# Patient Record
Sex: FEMALE | Race: WHITE | NOT HISPANIC OR LATINO | Employment: OTHER | URBAN - METROPOLITAN AREA
[De-identification: names, ages, dates, MRNs, and addresses within clinical notes are randomized per-mention and may not be internally consistent; named-entity substitution may affect disease eponyms.]

---

## 2024-03-27 ENCOUNTER — APPOINTMENT (OUTPATIENT)
Dept: RADIOLOGY | Facility: MEDICAL CENTER | Age: 89
DRG: 480 | End: 2024-03-27
Attending: STUDENT IN AN ORGANIZED HEALTH CARE EDUCATION/TRAINING PROGRAM
Payer: COMMERCIAL

## 2024-03-27 ENCOUNTER — HOSPITAL ENCOUNTER (INPATIENT)
Facility: MEDICAL CENTER | Age: 89
DRG: 480 | End: 2024-03-27
Attending: STUDENT IN AN ORGANIZED HEALTH CARE EDUCATION/TRAINING PROGRAM | Admitting: HOSPITALIST
Payer: COMMERCIAL

## 2024-03-27 DIAGNOSIS — S72.422A CLOSED BICONDYLAR FRACTURE OF LEFT FEMUR, INITIAL ENCOUNTER (HCC): ICD-10-CM

## 2024-03-27 DIAGNOSIS — S09.90XA CLOSED HEAD INJURY, INITIAL ENCOUNTER: ICD-10-CM

## 2024-03-27 DIAGNOSIS — I10 PRIMARY HYPERTENSION: ICD-10-CM
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
## 2024-03-27 DIAGNOSIS — K22.10 EROSIVE ESOPHAGITIS: ICD-10-CM

## 2024-03-27 DIAGNOSIS — S72.432A CLOSED BICONDYLAR FRACTURE OF LEFT FEMUR, INITIAL ENCOUNTER (HCC): ICD-10-CM

## 2024-03-27 LAB
ALBUMIN SERPL BCP-MCNC: 3.8 G/DL (ref 3.2–4.9)
ALBUMIN/GLOB SERPL: 1.6 G/DL
ALP SERPL-CCNC: 109 U/L (ref 30–99)
ALT SERPL-CCNC: 15 U/L (ref 2–50)
ANION GAP SERPL CALC-SCNC: 10 MMOL/L (ref 7–16)
APTT PPP: 54.9 SEC (ref 24.7–36)
AST SERPL-CCNC: 20 U/L (ref 12–45)
BASOPHILS # BLD AUTO: 0.5 % (ref 0–1.8)
BASOPHILS # BLD: 0.05 K/UL (ref 0–0.12)
BILIRUB SERPL-MCNC: 0.4 MG/DL (ref 0.1–1.5)
BUN SERPL-MCNC: 23 MG/DL (ref 8–22)
CALCIUM ALBUM COR SERPL-MCNC: 9.5 MG/DL (ref 8.5–10.5)
CALCIUM SERPL-MCNC: 9.3 MG/DL (ref 8.5–10.5)
CHLORIDE SERPL-SCNC: 97 MMOL/L (ref 96–112)
CO2 SERPL-SCNC: 25 MMOL/L (ref 20–33)
CREAT SERPL-MCNC: 1.31 MG/DL (ref 0.5–1.4)
EKG IMPRESSION: NORMAL
EOSINOPHIL # BLD AUTO: 0.04 K/UL (ref 0–0.51)
EOSINOPHIL NFR BLD: 0.4 % (ref 0–6.9)
ERYTHROCYTE [DISTWIDTH] IN BLOOD BY AUTOMATED COUNT: 43.2 FL (ref 35.9–50)
GFR SERPLBLD CREATININE-BSD FMLA CKD-EPI: 39 ML/MIN/1.73 M 2
GLOBULIN SER CALC-MCNC: 2.4 G/DL (ref 1.9–3.5)
GLUCOSE SERPL-MCNC: 114 MG/DL (ref 65–99)
HCT VFR BLD AUTO: 30.4 % (ref 37–47)
HGB BLD-MCNC: 10.1 G/DL (ref 12–16)
IMM GRANULOCYTES # BLD AUTO: 0.04 K/UL (ref 0–0.11)
IMM GRANULOCYTES NFR BLD AUTO: 0.4 % (ref 0–0.9)
INR PPP: 1.99 (ref 0.87–1.13)
LYMPHOCYTES # BLD AUTO: 1.3 K/UL (ref 1–4.8)
LYMPHOCYTES NFR BLD: 13.7 % (ref 22–41)
MCH RBC QN AUTO: 31.9 PG (ref 27–33)
MCHC RBC AUTO-ENTMCNC: 33.2 G/DL (ref 32.2–35.5)
MCV RBC AUTO: 95.9 FL (ref 81.4–97.8)
MONOCYTES # BLD AUTO: 0.7 K/UL (ref 0–0.85)
MONOCYTES NFR BLD AUTO: 7.4 % (ref 0–13.4)
NEUTROPHILS # BLD AUTO: 7.33 K/UL (ref 1.82–7.42)
NEUTROPHILS NFR BLD: 77.6 % (ref 44–72)
NRBC # BLD AUTO: 0 K/UL
NRBC BLD-RTO: 0 /100 WBC (ref 0–0.2)
PLATELET # BLD AUTO: 165 K/UL (ref 164–446)
PMV BLD AUTO: 10.5 FL (ref 9–12.9)
POTASSIUM SERPL-SCNC: 4.9 MMOL/L (ref 3.6–5.5)
PROT SERPL-MCNC: 6.2 G/DL (ref 6–8.2)
PROTHROMBIN TIME: 22.8 SEC (ref 12–14.6)
RBC # BLD AUTO: 3.17 M/UL (ref 4.2–5.4)
SODIUM SERPL-SCNC: 132 MMOL/L (ref 135–145)
WBC # BLD AUTO: 9.5 K/UL (ref 4.8–10.8)

## 2024-03-27 PROCEDURE — 700111 HCHG RX REV CODE 636 W/ 250 OVERRIDE (IP): Mod: JZ | Performed by: STUDENT IN AN ORGANIZED HEALTH CARE EDUCATION/TRAINING PROGRAM

## 2024-03-27 PROCEDURE — 85730 THROMBOPLASTIN TIME PARTIAL: CPT

## 2024-03-27 PROCEDURE — 73560 X-RAY EXAM OF KNEE 1 OR 2: CPT | Mod: LT

## 2024-03-27 PROCEDURE — 99285 EMERGENCY DEPT VISIT HI MDM: CPT

## 2024-03-27 PROCEDURE — 70450 CT HEAD/BRAIN W/O DYE: CPT

## 2024-03-27 PROCEDURE — 80053 COMPREHEN METABOLIC PANEL: CPT

## 2024-03-27 PROCEDURE — 85610 PROTHROMBIN TIME: CPT

## 2024-03-27 PROCEDURE — 85025 COMPLETE CBC W/AUTO DIFF WBC: CPT

## 2024-03-27 PROCEDURE — 700111 HCHG RX REV CODE 636 W/ 250 OVERRIDE (IP): Performed by: HOSPITALIST

## 2024-03-27 PROCEDURE — 770006 HCHG ROOM/CARE - MED/SURG/GYN SEMI*

## 2024-03-27 PROCEDURE — 96374 THER/PROPH/DIAG INJ IV PUSH: CPT

## 2024-03-27 PROCEDURE — 99223 1ST HOSP IP/OBS HIGH 75: CPT | Mod: AI | Performed by: HOSPITALIST

## 2024-03-27 PROCEDURE — 93005 ELECTROCARDIOGRAM TRACING: CPT | Performed by: STUDENT IN AN ORGANIZED HEALTH CARE EDUCATION/TRAINING PROGRAM

## 2024-03-27 PROCEDURE — 96375 TX/PRO/DX INJ NEW DRUG ADDON: CPT

## 2024-03-27 PROCEDURE — 36415 COLL VENOUS BLD VENIPUNCTURE: CPT

## 2024-03-27 RX ORDER — FUROSEMIDE 40 MG/1
80 TABLET ORAL EVERY MORNING
Status: ON HOLD | COMMUNITY
End: 2024-04-03

## 2024-03-27 RX ORDER — CARVEDILOL 6.25 MG/1
6.25 TABLET ORAL 2 TIMES DAILY WITH MEALS
Status: ON HOLD | COMMUNITY
End: 2024-04-03

## 2024-03-27 RX ORDER — LORATADINE 10 MG/1
10 TABLET ORAL DAILY
COMMUNITY

## 2024-03-27 RX ORDER — LISINOPRIL 5 MG/1
5 TABLET ORAL EVERY MORNING
COMMUNITY

## 2024-03-27 RX ORDER — ASPIRIN 81 MG/1
81 TABLET ORAL EVERY MORNING
Status: SHIPPED | COMMUNITY
End: 2024-03-27

## 2024-03-27 RX ORDER — ONDANSETRON 2 MG/ML
4 INJECTION INTRAMUSCULAR; INTRAVENOUS EVERY 4 HOURS PRN
Status: DISCONTINUED | OUTPATIENT
Start: 2024-03-27 | End: 2024-03-28

## 2024-03-27 RX ORDER — LEVOTHYROXINE SODIUM 175 UG/1
175 TABLET ORAL
COMMUNITY

## 2024-03-27 RX ORDER — OXYCODONE HYDROCHLORIDE 5 MG/1
5 TABLET ORAL
Status: DISCONTINUED | OUTPATIENT
Start: 2024-03-27 | End: 2024-03-28

## 2024-03-27 RX ORDER — OXYCODONE HYDROCHLORIDE 5 MG/1
2.5 TABLET ORAL
Status: DISCONTINUED | OUTPATIENT
Start: 2024-03-27 | End: 2024-03-28

## 2024-03-27 RX ORDER — MORPHINE SULFATE 4 MG/ML
4 INJECTION INTRAVENOUS ONCE
Status: COMPLETED | OUTPATIENT
Start: 2024-03-27 | End: 2024-03-27

## 2024-03-27 RX ORDER — ONDANSETRON 4 MG/1
4 TABLET, ORALLY DISINTEGRATING ORAL EVERY 4 HOURS PRN
Status: DISCONTINUED | OUTPATIENT
Start: 2024-03-27 | End: 2024-04-04 | Stop reason: HOSPADM

## 2024-03-27 RX ORDER — ONDANSETRON 4 MG/1
4 TABLET, ORALLY DISINTEGRATING ORAL EVERY 6 HOURS PRN
Status: SHIPPED | COMMUNITY
End: 2024-03-27

## 2024-03-27 RX ORDER — ONDANSETRON 2 MG/ML
4 INJECTION INTRAMUSCULAR; INTRAVENOUS ONCE
Status: COMPLETED | OUTPATIENT
Start: 2024-03-27 | End: 2024-03-27

## 2024-03-27 RX ORDER — SPIRONOLACTONE 25 MG/1
25 TABLET ORAL EVERY MORNING
COMMUNITY

## 2024-03-27 RX ORDER — NITROGLYCERIN 0.4 MG/1
0.4 TABLET SUBLINGUAL
COMMUNITY

## 2024-03-27 RX ORDER — DABIGATRAN ETEXILATE 150 MG/1
150 CAPSULE ORAL 2 TIMES DAILY
COMMUNITY

## 2024-03-27 RX ORDER — BUDESONIDE AND FORMOTEROL FUMARATE DIHYDRATE 80; 4.5 UG/1; UG/1
2 AEROSOL RESPIRATORY (INHALATION) 2 TIMES DAILY
Status: SHIPPED | COMMUNITY
End: 2024-03-27

## 2024-03-27 RX ORDER — ATORVASTATIN CALCIUM 40 MG/1
40 TABLET, FILM COATED ORAL NIGHTLY
COMMUNITY

## 2024-03-27 RX ORDER — ACETAMINOPHEN 325 MG/1
650 TABLET ORAL EVERY 6 HOURS PRN
Status: DISCONTINUED | OUTPATIENT
Start: 2024-03-27 | End: 2024-03-28

## 2024-03-27 RX ORDER — MORPHINE SULFATE 4 MG/ML
4 INJECTION INTRAVENOUS
Status: DISCONTINUED | OUTPATIENT
Start: 2024-03-27 | End: 2024-03-28

## 2024-03-27 RX ORDER — MONTELUKAST SODIUM 10 MG/1
10 TABLET ORAL EVERY MORNING
COMMUNITY

## 2024-03-27 RX ORDER — ALBUTEROL SULFATE 90 UG/1
2 AEROSOL, METERED RESPIRATORY (INHALATION) PRN
COMMUNITY

## 2024-03-27 RX ORDER — HYDROMORPHONE HYDROCHLORIDE 1 MG/ML
0.25 INJECTION, SOLUTION INTRAMUSCULAR; INTRAVENOUS; SUBCUTANEOUS
Status: DISCONTINUED | OUTPATIENT
Start: 2024-03-27 | End: 2024-03-28

## 2024-03-27 RX ADMIN — HYDROMORPHONE HYDROCHLORIDE 0.25 MG: 1 INJECTION, SOLUTION INTRAMUSCULAR; INTRAVENOUS; SUBCUTANEOUS at 22:54

## 2024-03-27 RX ADMIN — ONDANSETRON 4 MG: 2 INJECTION INTRAMUSCULAR; INTRAVENOUS at 21:14

## 2024-03-27 RX ADMIN — MORPHINE SULFATE 4 MG: 4 INJECTION, SOLUTION INTRAMUSCULAR; INTRAVENOUS at 21:15

## 2024-03-27 ASSESSMENT — FIBROSIS 4 INDEX: FIB4 SCORE: 2.69

## 2024-03-28 ENCOUNTER — APPOINTMENT (OUTPATIENT)
Dept: RADIOLOGY | Facility: MEDICAL CENTER | Age: 89
DRG: 480 | End: 2024-03-28
Attending: ORTHOPAEDIC SURGERY
Payer: COMMERCIAL

## 2024-03-28 ENCOUNTER — ANESTHESIA (OUTPATIENT)
Dept: SURGERY | Facility: MEDICAL CENTER | Age: 89
End: 2024-03-28
Payer: COMMERCIAL

## 2024-03-28 ENCOUNTER — ANESTHESIA EVENT (OUTPATIENT)
Dept: SURGERY | Facility: MEDICAL CENTER | Age: 89
End: 2024-03-28
Payer: COMMERCIAL

## 2024-03-28 PROBLEM — J44.9 COPD (CHRONIC OBSTRUCTIVE PULMONARY DISEASE) (HCC): Status: ACTIVE | Noted: 2024-03-28

## 2024-03-28 PROBLEM — S72.409A CLOSED FRACTURE OF DISTAL END OF FEMUR (HCC): Status: ACTIVE | Noted: 2024-03-27

## 2024-03-28 PROBLEM — I35.0 AORTIC STENOSIS: Status: ACTIVE | Noted: 2024-03-28

## 2024-03-28 PROBLEM — Z01.818 ENCOUNTER FOR PREOPERATIVE ASSESSMENT: Status: ACTIVE | Noted: 2024-03-28
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
## 2024-03-28 PROBLEM — I10 HYPERTENSION: Status: ACTIVE | Noted: 2024-03-28

## 2024-03-28 PROBLEM — I48.91 ATRIAL FIBRILLATION (HCC): Status: ACTIVE | Noted: 2024-03-28

## 2024-03-28 PROBLEM — J96.11 CHRONIC HYPOXIC RESPIRATORY FAILURE (HCC): Status: ACTIVE | Noted: 2024-03-28

## 2024-03-28 LAB
ABO + RH BLD: NORMAL
ABO GROUP BLD: NORMAL
ALBUMIN SERPL BCP-MCNC: 3.7 G/DL (ref 3.2–4.9)
ALBUMIN/GLOB SERPL: 1.5 G/DL
ALP SERPL-CCNC: 116 U/L (ref 30–99)
ALT SERPL-CCNC: 14 U/L (ref 2–50)
ANION GAP SERPL CALC-SCNC: 15 MMOL/L (ref 7–16)
AST SERPL-CCNC: 17 U/L (ref 12–45)
BARCODED ABORH UBTYP: 1700
BARCODED PRD CODE UBPRD: NORMAL
BARCODED UNIT NUM UBUNT: NORMAL
BASOPHILS # BLD AUTO: 0.3 % (ref 0–1.8)
BASOPHILS # BLD: 0.03 K/UL (ref 0–0.12)
BILIRUB SERPL-MCNC: 0.7 MG/DL (ref 0.1–1.5)
BLD GP AB SCN SERPL QL: NORMAL
BUN SERPL-MCNC: 24 MG/DL (ref 8–22)
CALCIUM ALBUM COR SERPL-MCNC: 9.8 MG/DL (ref 8.5–10.5)
CALCIUM SERPL-MCNC: 9.6 MG/DL (ref 8.5–10.5)
CHLORIDE SERPL-SCNC: 96 MMOL/L (ref 96–112)
CO2 SERPL-SCNC: 23 MMOL/L (ref 20–33)
COMPONENT R 8504R: NORMAL
CREAT SERPL-MCNC: 1.37 MG/DL (ref 0.5–1.4)
EOSINOPHIL # BLD AUTO: 0 K/UL (ref 0–0.51)
EOSINOPHIL NFR BLD: 0 % (ref 0–6.9)
ERYTHROCYTE [DISTWIDTH] IN BLOOD BY AUTOMATED COUNT: 43.6 FL (ref 35.9–50)
GFR SERPLBLD CREATININE-BSD FMLA CKD-EPI: 37 ML/MIN/1.73 M 2
GLOBULIN SER CALC-MCNC: 2.5 G/DL (ref 1.9–3.5)
GLUCOSE SERPL-MCNC: 125 MG/DL (ref 65–99)
HCT VFR BLD AUTO: 29.6 % (ref 37–47)
HGB BLD-MCNC: 9.7 G/DL (ref 12–16)
IMM GRANULOCYTES # BLD AUTO: 0.07 K/UL (ref 0–0.11)
IMM GRANULOCYTES NFR BLD AUTO: 0.6 % (ref 0–0.9)
LYMPHOCYTES # BLD AUTO: 0.94 K/UL (ref 1–4.8)
LYMPHOCYTES NFR BLD: 8.7 % (ref 22–41)
MCH RBC QN AUTO: 31.4 PG (ref 27–33)
MCHC RBC AUTO-ENTMCNC: 32.8 G/DL (ref 32.2–35.5)
MCV RBC AUTO: 95.8 FL (ref 81.4–97.8)
MONOCYTES # BLD AUTO: 0.67 K/UL (ref 0–0.85)
MONOCYTES NFR BLD AUTO: 6.2 % (ref 0–13.4)
NEUTROPHILS # BLD AUTO: 9.15 K/UL (ref 1.82–7.42)
NEUTROPHILS NFR BLD: 84.2 % (ref 44–72)
NRBC # BLD AUTO: 0 K/UL
NRBC BLD-RTO: 0 /100 WBC (ref 0–0.2)
PLATELET # BLD AUTO: 173 K/UL (ref 164–446)
PMV BLD AUTO: 11.4 FL (ref 9–12.9)
POTASSIUM SERPL-SCNC: 4.8 MMOL/L (ref 3.6–5.5)
PRODUCT TYPE UPROD: NORMAL
PROT SERPL-MCNC: 6.2 G/DL (ref 6–8.2)
RBC # BLD AUTO: 3.09 M/UL (ref 4.2–5.4)
RH BLD: NORMAL
SODIUM SERPL-SCNC: 134 MMOL/L (ref 135–145)
UNIT STATUS USTAT: NORMAL
WBC # BLD AUTO: 10.9 K/UL (ref 4.8–10.8)

## 2024-03-28 PROCEDURE — 27511 TREATMENT OF THIGH FRACTURE: CPT | Mod: 80ROC,LT | Performed by: STUDENT IN AN ORGANIZED HEALTH CARE EDUCATION/TRAINING PROGRAM

## 2024-03-28 PROCEDURE — A9270 NON-COVERED ITEM OR SERVICE: HCPCS | Performed by: ORTHOPAEDIC SURGERY

## 2024-03-28 PROCEDURE — 700101 HCHG RX REV CODE 250: Performed by: ANESTHESIOLOGY

## 2024-03-28 PROCEDURE — 0QSC04Z REPOSITION LEFT LOWER FEMUR WITH INTERNAL FIXATION DEVICE, OPEN APPROACH: ICD-10-PCS | Performed by: ORTHOPAEDIC SURGERY

## 2024-03-28 PROCEDURE — 160002 HCHG RECOVERY MINUTES (STAT): Performed by: ORTHOPAEDIC SURGERY

## 2024-03-28 PROCEDURE — 99233 SBSQ HOSP IP/OBS HIGH 50: CPT | Performed by: HOSPITALIST

## 2024-03-28 PROCEDURE — 27511 TREATMENT OF THIGH FRACTURE: CPT | Mod: LT | Performed by: ORTHOPAEDIC SURGERY

## 2024-03-28 PROCEDURE — 86900 BLOOD TYPING SEROLOGIC ABO: CPT

## 2024-03-28 PROCEDURE — A9270 NON-COVERED ITEM OR SERVICE: HCPCS | Performed by: ANESTHESIOLOGY

## 2024-03-28 PROCEDURE — 700102 HCHG RX REV CODE 250 W/ 637 OVERRIDE(OP): Performed by: ORTHOPAEDIC SURGERY

## 2024-03-28 PROCEDURE — 700111 HCHG RX REV CODE 636 W/ 250 OVERRIDE (IP): Performed by: HOSPITALIST

## 2024-03-28 PROCEDURE — A9270 NON-COVERED ITEM OR SERVICE: HCPCS | Performed by: HOSPITALIST

## 2024-03-28 PROCEDURE — 86901 BLOOD TYPING SEROLOGIC RH(D): CPT

## 2024-03-28 PROCEDURE — 700111 HCHG RX REV CODE 636 W/ 250 OVERRIDE (IP): Mod: JZ | Performed by: ANESTHESIOLOGY

## 2024-03-28 PROCEDURE — C1713 ANCHOR/SCREW BN/BN,TIS/BN: HCPCS | Performed by: ORTHOPAEDIC SURGERY

## 2024-03-28 PROCEDURE — 160009 HCHG ANES TIME/MIN: Performed by: ORTHOPAEDIC SURGERY

## 2024-03-28 PROCEDURE — 770006 HCHG ROOM/CARE - MED/SURG/GYN SEMI*

## 2024-03-28 PROCEDURE — 80053 COMPREHEN METABOLIC PANEL: CPT

## 2024-03-28 PROCEDURE — 700102 HCHG RX REV CODE 250 W/ 637 OVERRIDE(OP): Performed by: HOSPITALIST

## 2024-03-28 PROCEDURE — 160048 HCHG OR STATISTICAL LEVEL 1-5: Performed by: ORTHOPAEDIC SURGERY

## 2024-03-28 PROCEDURE — 94664 DEMO&/EVAL PT USE INHALER: CPT

## 2024-03-28 PROCEDURE — 502000 HCHG MISC OR IMPLANTS RC 0278: Performed by: ORTHOPAEDIC SURGERY

## 2024-03-28 PROCEDURE — 86923 COMPATIBILITY TEST ELECTRIC: CPT

## 2024-03-28 PROCEDURE — 86850 RBC ANTIBODY SCREEN: CPT

## 2024-03-28 PROCEDURE — 700111 HCHG RX REV CODE 636 W/ 250 OVERRIDE (IP): Performed by: INTERNAL MEDICINE

## 2024-03-28 PROCEDURE — 700101 HCHG RX REV CODE 250: Performed by: INTERNAL MEDICINE

## 2024-03-28 PROCEDURE — 700102 HCHG RX REV CODE 250 W/ 637 OVERRIDE(OP): Performed by: ANESTHESIOLOGY

## 2024-03-28 PROCEDURE — 73552 X-RAY EXAM OF FEMUR 2/>: CPT | Mod: LT

## 2024-03-28 PROCEDURE — 700111 HCHG RX REV CODE 636 W/ 250 OVERRIDE (IP): Performed by: ANESTHESIOLOGY

## 2024-03-28 PROCEDURE — 700105 HCHG RX REV CODE 258: Performed by: ANESTHESIOLOGY

## 2024-03-28 PROCEDURE — P9016 RBC LEUKOCYTES REDUCED: HCPCS

## 2024-03-28 PROCEDURE — 99222 1ST HOSP IP/OBS MODERATE 55: CPT | Mod: 57 | Performed by: ORTHOPAEDIC SURGERY

## 2024-03-28 PROCEDURE — 160039 HCHG SURGERY MINUTES - EA ADDL 1 MIN LEVEL 3: Performed by: ORTHOPAEDIC SURGERY

## 2024-03-28 PROCEDURE — 160028 HCHG SURGERY MINUTES - 1ST 30 MINS LEVEL 3: Performed by: ORTHOPAEDIC SURGERY

## 2024-03-28 PROCEDURE — 36415 COLL VENOUS BLD VENIPUNCTURE: CPT

## 2024-03-28 PROCEDURE — 160035 HCHG PACU - 1ST 60 MINS PHASE I: Performed by: ORTHOPAEDIC SURGERY

## 2024-03-28 PROCEDURE — 700111 HCHG RX REV CODE 636 W/ 250 OVERRIDE (IP): Performed by: ORTHOPAEDIC SURGERY

## 2024-03-28 PROCEDURE — 85025 COMPLETE CBC W/AUTO DIFF WBC: CPT

## 2024-03-28 PROCEDURE — 36430 TRANSFUSION BLD/BLD COMPNT: CPT

## 2024-03-28 PROCEDURE — 700105 HCHG RX REV CODE 258: Performed by: INTERNAL MEDICINE

## 2024-03-28 DEVICE — IMPLANTABLE DEVICE: Type: IMPLANTABLE DEVICE | Status: FUNCTIONAL

## 2024-03-28 DEVICE — 4.5MM CORTEX TI SCREW 4.5MM  L50MM: Type: IMPLANTABLE DEVICE | Status: FUNCTIONAL

## 2024-03-28 DEVICE — 4.5MM CORTEX TI SCREW 4.5MM  L40MM: Type: IMPLANTABLE DEVICE | Status: FUNCTIONAL

## 2024-03-28 DEVICE — 5MM LOCKING SCREW 5.0MM  L85MM: Type: IMPLANTABLE DEVICE | Status: FUNCTIONAL

## 2024-03-28 RX ORDER — SCOLOPAMINE TRANSDERMAL SYSTEM 1 MG/1
1 PATCH, EXTENDED RELEASE TRANSDERMAL
Status: DISCONTINUED | OUTPATIENT
Start: 2024-03-28 | End: 2024-04-04 | Stop reason: HOSPADM

## 2024-03-28 RX ORDER — HYDROMORPHONE HYDROCHLORIDE 1 MG/ML
0.5 INJECTION, SOLUTION INTRAMUSCULAR; INTRAVENOUS; SUBCUTANEOUS
Status: DISCONTINUED | OUTPATIENT
Start: 2024-03-28 | End: 2024-04-04 | Stop reason: HOSPADM

## 2024-03-28 RX ORDER — METOPROLOL TARTRATE 1 MG/ML
INJECTION, SOLUTION INTRAVENOUS PRN
Status: DISCONTINUED | OUTPATIENT
Start: 2024-03-28 | End: 2024-03-28 | Stop reason: SURG

## 2024-03-28 RX ORDER — DEXAMETHASONE SODIUM PHOSPHATE 4 MG/ML
4 INJECTION, SOLUTION INTRA-ARTICULAR; INTRALESIONAL; INTRAMUSCULAR; INTRAVENOUS; SOFT TISSUE
Status: COMPLETED | OUTPATIENT
Start: 2024-03-28 | End: 2024-03-29

## 2024-03-28 RX ORDER — KETOROLAC TROMETHAMINE 15 MG/ML
15 INJECTION, SOLUTION INTRAMUSCULAR; INTRAVENOUS EVERY 6 HOURS
Qty: 12 ML | Refills: 0 | Status: DISCONTINUED | OUTPATIENT
Start: 2024-03-28 | End: 2024-03-29

## 2024-03-28 RX ORDER — ACETAMINOPHEN 325 MG/1
650 TABLET ORAL EVERY 6 HOURS PRN
Status: DISCONTINUED | OUTPATIENT
Start: 2024-04-02 | End: 2024-03-30

## 2024-03-28 RX ORDER — ORPHENADRINE CITRATE 100 MG/1
100 TABLET, EXTENDED RELEASE ORAL 2 TIMES DAILY
COMMUNITY
Start: 2024-03-26

## 2024-03-28 RX ORDER — ENEMA 19; 7 G/133ML; G/133ML
1 ENEMA RECTAL
Status: DISCONTINUED | OUTPATIENT
Start: 2024-03-28 | End: 2024-04-04 | Stop reason: HOSPADM

## 2024-03-28 RX ORDER — AMOXICILLIN 250 MG
1 CAPSULE ORAL NIGHTLY
Status: DISCONTINUED | OUTPATIENT
Start: 2024-03-28 | End: 2024-04-04 | Stop reason: HOSPADM

## 2024-03-28 RX ORDER — DOCUSATE SODIUM 100 MG/1
100 CAPSULE, LIQUID FILLED ORAL 2 TIMES DAILY
Status: DISCONTINUED | OUTPATIENT
Start: 2024-03-28 | End: 2024-04-04 | Stop reason: HOSPADM

## 2024-03-28 RX ORDER — HYDROMORPHONE HYDROCHLORIDE 1 MG/ML
1 INJECTION, SOLUTION INTRAMUSCULAR; INTRAVENOUS; SUBCUTANEOUS ONCE
Status: COMPLETED | OUTPATIENT
Start: 2024-03-28 | End: 2024-03-28

## 2024-03-28 RX ORDER — ALBUTEROL SULFATE 90 UG/1
2 AEROSOL, METERED RESPIRATORY (INHALATION) PRN
Status: DISCONTINUED | OUTPATIENT
Start: 2024-03-28 | End: 2024-04-04 | Stop reason: HOSPADM

## 2024-03-28 RX ORDER — HYDROMORPHONE HYDROCHLORIDE 1 MG/ML
0.1 INJECTION, SOLUTION INTRAMUSCULAR; INTRAVENOUS; SUBCUTANEOUS
Status: DISCONTINUED | OUTPATIENT
Start: 2024-03-28 | End: 2024-03-28 | Stop reason: HOSPADM

## 2024-03-28 RX ORDER — PHENYLEPHRINE HYDROCHLORIDE 10 MG/ML
INJECTION, SOLUTION INTRAMUSCULAR; INTRAVENOUS; SUBCUTANEOUS PRN
Status: DISCONTINUED | OUTPATIENT
Start: 2024-03-28 | End: 2024-03-28 | Stop reason: SURG

## 2024-03-28 RX ORDER — BISACODYL 10 MG
10 SUPPOSITORY, RECTAL RECTAL
Status: DISCONTINUED | OUTPATIENT
Start: 2024-03-28 | End: 2024-04-04 | Stop reason: HOSPADM

## 2024-03-28 RX ORDER — OXYCODONE HYDROCHLORIDE 5 MG/1
5 TABLET ORAL
Status: DISCONTINUED | OUTPATIENT
Start: 2024-03-28 | End: 2024-04-04 | Stop reason: HOSPADM

## 2024-03-28 RX ORDER — HYDROMORPHONE HYDROCHLORIDE 1 MG/ML
0.2 INJECTION, SOLUTION INTRAMUSCULAR; INTRAVENOUS; SUBCUTANEOUS
Status: DISCONTINUED | OUTPATIENT
Start: 2024-03-28 | End: 2024-03-28 | Stop reason: HOSPADM

## 2024-03-28 RX ORDER — ONDANSETRON 2 MG/ML
INJECTION INTRAMUSCULAR; INTRAVENOUS PRN
Status: DISCONTINUED | OUTPATIENT
Start: 2024-03-28 | End: 2024-03-28 | Stop reason: SURG

## 2024-03-28 RX ORDER — HALOPERIDOL 5 MG/ML
1 INJECTION INTRAMUSCULAR
Status: DISCONTINUED | OUTPATIENT
Start: 2024-03-28 | End: 2024-03-28 | Stop reason: HOSPADM

## 2024-03-28 RX ORDER — ESMOLOL HYDROCHLORIDE 10 MG/ML
INJECTION INTRAVENOUS PRN
Status: DISCONTINUED | OUTPATIENT
Start: 2024-03-28 | End: 2024-03-28 | Stop reason: SURG

## 2024-03-28 RX ORDER — OXYCODONE HYDROCHLORIDE 5 MG/1
5 TABLET ORAL
Status: DISCONTINUED | OUTPATIENT
Start: 2024-03-28 | End: 2024-03-28

## 2024-03-28 RX ORDER — OXYCODONE HCL 5 MG/5 ML
10 SOLUTION, ORAL ORAL
Status: COMPLETED | OUTPATIENT
Start: 2024-03-28 | End: 2024-03-28

## 2024-03-28 RX ORDER — ROCURONIUM BROMIDE 10 MG/ML
INJECTION, SOLUTION INTRAVENOUS PRN
Status: DISCONTINUED | OUTPATIENT
Start: 2024-03-28 | End: 2024-03-28 | Stop reason: SURG

## 2024-03-28 RX ORDER — LEVOTHYROXINE SODIUM 175 UG/1
175 TABLET ORAL
Status: DISCONTINUED | OUTPATIENT
Start: 2024-03-28 | End: 2024-04-04 | Stop reason: HOSPADM

## 2024-03-28 RX ORDER — OXYCODONE HYDROCHLORIDE 5 MG/1
2.5 TABLET ORAL
Status: DISCONTINUED | OUTPATIENT
Start: 2024-03-28 | End: 2024-03-28

## 2024-03-28 RX ORDER — HYDRALAZINE HYDROCHLORIDE 20 MG/ML
5 INJECTION INTRAMUSCULAR; INTRAVENOUS
Status: DISCONTINUED | OUTPATIENT
Start: 2024-03-28 | End: 2024-03-28 | Stop reason: HOSPADM

## 2024-03-28 RX ORDER — OXYCODONE HYDROCHLORIDE 10 MG/1
10 TABLET ORAL
Status: DISCONTINUED | OUTPATIENT
Start: 2024-03-28 | End: 2024-04-04 | Stop reason: HOSPADM

## 2024-03-28 RX ORDER — HALOPERIDOL 5 MG/ML
1 INJECTION INTRAMUSCULAR EVERY 6 HOURS PRN
Status: DISCONTINUED | OUTPATIENT
Start: 2024-03-28 | End: 2024-04-04 | Stop reason: HOSPADM

## 2024-03-28 RX ORDER — POLYETHYLENE GLYCOL 3350 17 G/17G
1 POWDER, FOR SOLUTION ORAL 2 TIMES DAILY PRN
Status: DISCONTINUED | OUTPATIENT
Start: 2024-03-28 | End: 2024-04-04 | Stop reason: HOSPADM

## 2024-03-28 RX ORDER — ONDANSETRON 2 MG/ML
4 INJECTION INTRAMUSCULAR; INTRAVENOUS EVERY 4 HOURS PRN
Status: DISCONTINUED | OUTPATIENT
Start: 2024-03-28 | End: 2024-04-04 | Stop reason: HOSPADM

## 2024-03-28 RX ORDER — DIPHENHYDRAMINE HYDROCHLORIDE 50 MG/ML
25 INJECTION INTRAMUSCULAR; INTRAVENOUS EVERY 6 HOURS PRN
Status: DISCONTINUED | OUTPATIENT
Start: 2024-03-28 | End: 2024-04-02

## 2024-03-28 RX ORDER — HYDROMORPHONE HYDROCHLORIDE 1 MG/ML
1 INJECTION, SOLUTION INTRAMUSCULAR; INTRAVENOUS; SUBCUTANEOUS
Status: DISCONTINUED | OUTPATIENT
Start: 2024-03-28 | End: 2024-03-28

## 2024-03-28 RX ORDER — LISINOPRIL 5 MG/1
5 TABLET ORAL EVERY MORNING
Status: DISCONTINUED | OUTPATIENT
Start: 2024-03-28 | End: 2024-04-04 | Stop reason: HOSPADM

## 2024-03-28 RX ORDER — ENOXAPARIN SODIUM 100 MG/ML
40 INJECTION SUBCUTANEOUS DAILY
Status: DISCONTINUED | OUTPATIENT
Start: 2024-03-29 | End: 2024-03-29

## 2024-03-28 RX ORDER — CARVEDILOL 6.25 MG/1
6.25 TABLET ORAL 2 TIMES DAILY WITH MEALS
Status: DISCONTINUED | OUTPATIENT
Start: 2024-03-28 | End: 2024-03-29

## 2024-03-28 RX ORDER — MONTELUKAST SODIUM 10 MG/1
10 TABLET ORAL EVERY MORNING
Status: DISCONTINUED | OUTPATIENT
Start: 2024-03-28 | End: 2024-04-04 | Stop reason: HOSPADM

## 2024-03-28 RX ORDER — AMOXICILLIN 250 MG
1 CAPSULE ORAL
Status: DISCONTINUED | OUTPATIENT
Start: 2024-03-28 | End: 2024-04-04 | Stop reason: HOSPADM

## 2024-03-28 RX ORDER — ACETAMINOPHEN 325 MG/1
650 TABLET ORAL EVERY 6 HOURS
Status: DISCONTINUED | OUTPATIENT
Start: 2024-03-28 | End: 2024-03-30

## 2024-03-28 RX ORDER — ONDANSETRON 2 MG/ML
4 INJECTION INTRAMUSCULAR; INTRAVENOUS
Status: DISCONTINUED | OUTPATIENT
Start: 2024-03-28 | End: 2024-03-28 | Stop reason: HOSPADM

## 2024-03-28 RX ORDER — LORATADINE 10 MG/1
10 TABLET ORAL DAILY
Status: DISCONTINUED | OUTPATIENT
Start: 2024-03-28 | End: 2024-04-04 | Stop reason: HOSPADM

## 2024-03-28 RX ORDER — ATORVASTATIN CALCIUM 40 MG/1
40 TABLET, FILM COATED ORAL NIGHTLY
Status: DISCONTINUED | OUTPATIENT
Start: 2024-03-28 | End: 2024-04-04 | Stop reason: HOSPADM

## 2024-03-28 RX ORDER — LABETALOL HYDROCHLORIDE 5 MG/ML
5 INJECTION, SOLUTION INTRAVENOUS
Status: DISCONTINUED | OUTPATIENT
Start: 2024-03-28 | End: 2024-03-28 | Stop reason: HOSPADM

## 2024-03-28 RX ORDER — SODIUM CHLORIDE, SODIUM LACTATE, POTASSIUM CHLORIDE, CALCIUM CHLORIDE 600; 310; 30; 20 MG/100ML; MG/100ML; MG/100ML; MG/100ML
INJECTION, SOLUTION INTRAVENOUS CONTINUOUS
Status: DISCONTINUED | OUTPATIENT
Start: 2024-03-28 | End: 2024-03-28 | Stop reason: HOSPADM

## 2024-03-28 RX ORDER — OXYCODONE HCL 5 MG/5 ML
5 SOLUTION, ORAL ORAL
Status: COMPLETED | OUTPATIENT
Start: 2024-03-28 | End: 2024-03-28

## 2024-03-28 RX ORDER — CEFAZOLIN SODIUM 1 G/3ML
INJECTION, POWDER, FOR SOLUTION INTRAMUSCULAR; INTRAVENOUS PRN
Status: DISCONTINUED | OUTPATIENT
Start: 2024-03-28 | End: 2024-03-28 | Stop reason: SURG

## 2024-03-28 RX ORDER — DEXAMETHASONE SODIUM PHOSPHATE 4 MG/ML
INJECTION, SOLUTION INTRA-ARTICULAR; INTRALESIONAL; INTRAMUSCULAR; INTRAVENOUS; SOFT TISSUE PRN
Status: DISCONTINUED | OUTPATIENT
Start: 2024-03-28 | End: 2024-03-28 | Stop reason: SURG

## 2024-03-28 RX ORDER — EPHEDRINE SULFATE 50 MG/ML
5 INJECTION, SOLUTION INTRAVENOUS
Status: DISCONTINUED | OUTPATIENT
Start: 2024-03-28 | End: 2024-03-28 | Stop reason: HOSPADM

## 2024-03-28 RX ORDER — SODIUM CHLORIDE, SODIUM GLUCONATE, SODIUM ACETATE, POTASSIUM CHLORIDE AND MAGNESIUM CHLORIDE 526; 502; 368; 37; 30 MG/100ML; MG/100ML; MG/100ML; MG/100ML; MG/100ML
INJECTION, SOLUTION INTRAVENOUS
Status: DISCONTINUED | OUTPATIENT
Start: 2024-03-28 | End: 2024-03-28 | Stop reason: SURG

## 2024-03-28 RX ORDER — LIDOCAINE HYDROCHLORIDE 20 MG/ML
INJECTION, SOLUTION EPIDURAL; INFILTRATION; INTRACAUDAL; PERINEURAL PRN
Status: DISCONTINUED | OUTPATIENT
Start: 2024-03-28 | End: 2024-03-28 | Stop reason: SURG

## 2024-03-28 RX ORDER — SODIUM CHLORIDE, SODIUM LACTATE, POTASSIUM CHLORIDE, CALCIUM CHLORIDE 600; 310; 30; 20 MG/100ML; MG/100ML; MG/100ML; MG/100ML
INJECTION, SOLUTION INTRAVENOUS
Status: DISCONTINUED | OUTPATIENT
Start: 2024-03-28 | End: 2024-03-28 | Stop reason: SURG

## 2024-03-28 RX ORDER — HYDROMORPHONE HYDROCHLORIDE 1 MG/ML
0.4 INJECTION, SOLUTION INTRAMUSCULAR; INTRAVENOUS; SUBCUTANEOUS
Status: DISCONTINUED | OUTPATIENT
Start: 2024-03-28 | End: 2024-03-28 | Stop reason: HOSPADM

## 2024-03-28 RX ORDER — DIPHENHYDRAMINE HYDROCHLORIDE 50 MG/ML
12.5 INJECTION INTRAMUSCULAR; INTRAVENOUS
Status: DISCONTINUED | OUTPATIENT
Start: 2024-03-28 | End: 2024-03-28 | Stop reason: HOSPADM

## 2024-03-28 RX ORDER — IBUPROFEN 800 MG/1
800 TABLET ORAL 3 TIMES DAILY PRN
Status: DISCONTINUED | OUTPATIENT
Start: 2024-03-31 | End: 2024-03-29

## 2024-03-28 RX ADMIN — DOCUSATE SODIUM 50 MG AND SENNOSIDES 8.6 MG 1 TABLET: 8.6; 5 TABLET, FILM COATED ORAL at 21:07

## 2024-03-28 RX ADMIN — SODIUM CHLORIDE, SODIUM GLUCONATE, SODIUM ACETATE, POTASSIUM CHLORIDE AND MAGNESIUM CHLORIDE: 526; 502; 368; 37; 30 INJECTION, SOLUTION INTRAVENOUS at 17:35

## 2024-03-28 RX ADMIN — KETOROLAC TROMETHAMINE 15 MG: 15 INJECTION, SOLUTION INTRAMUSCULAR; INTRAVENOUS at 21:03

## 2024-03-28 RX ADMIN — LEVOTHYROXINE SODIUM 175 MCG: 0.17 TABLET ORAL at 05:44

## 2024-03-28 RX ADMIN — DEXAMETHASONE SODIUM PHOSPHATE 4 MG: 4 INJECTION INTRA-ARTICULAR; INTRALESIONAL; INTRAMUSCULAR; INTRAVENOUS; SOFT TISSUE at 16:50

## 2024-03-28 RX ADMIN — OXYCODONE 2.5 MG: 5 TABLET ORAL at 05:44

## 2024-03-28 RX ADMIN — OXYCODONE HYDROCHLORIDE 10 MG: 5 SOLUTION ORAL at 18:19

## 2024-03-28 RX ADMIN — ACETAMINOPHEN 650 MG: 325 TABLET, FILM COATED ORAL at 21:06

## 2024-03-28 RX ADMIN — FENTANYL CITRATE 25 MCG: 50 INJECTION, SOLUTION INTRAMUSCULAR; INTRAVENOUS at 18:28

## 2024-03-28 RX ADMIN — HYDROMORPHONE HYDROCHLORIDE 1 MG: 1 INJECTION, SOLUTION INTRAMUSCULAR; INTRAVENOUS; SUBCUTANEOUS at 01:44

## 2024-03-28 RX ADMIN — LIDOCAINE HYDROCHLORIDE 100 MG: 20 INJECTION, SOLUTION EPIDURAL; INFILTRATION; INTRACAUDAL at 16:46

## 2024-03-28 RX ADMIN — OXYCODONE 2.5 MG: 5 TABLET ORAL at 09:12

## 2024-03-28 RX ADMIN — ONDANSETRON 4 MG: 2 INJECTION INTRAMUSCULAR; INTRAVENOUS at 17:54

## 2024-03-28 RX ADMIN — PHENYLEPHRINE HYDROCHLORIDE 100 MCG: 10 INJECTION INTRAVENOUS at 17:00

## 2024-03-28 RX ADMIN — LISINOPRIL 5 MG: 5 TABLET ORAL at 05:44

## 2024-03-28 RX ADMIN — CEFAZOLIN 2 G: 1 INJECTION, POWDER, FOR SOLUTION INTRAMUSCULAR; INTRAVENOUS at 16:52

## 2024-03-28 RX ADMIN — ESMOLOL HYDROCHLORIDE 20 MG: 100 INJECTION, SOLUTION INTRAVENOUS at 17:17

## 2024-03-28 RX ADMIN — FENTANYL CITRATE 100 MCG: 50 INJECTION, SOLUTION INTRAMUSCULAR; INTRAVENOUS at 16:46

## 2024-03-28 RX ADMIN — SUGAMMADEX 200 MG: 100 INJECTION, SOLUTION INTRAVENOUS at 17:56

## 2024-03-28 RX ADMIN — ACETAMINOPHEN 650 MG: 325 TABLET, FILM COATED ORAL at 13:36

## 2024-03-28 RX ADMIN — PHENYLEPHRINE HYDROCHLORIDE 200 MCG: 10 INJECTION INTRAVENOUS at 16:48

## 2024-03-28 RX ADMIN — ESMOLOL HYDROCHLORIDE 20 MG: 100 INJECTION, SOLUTION INTRAVENOUS at 17:04

## 2024-03-28 RX ADMIN — PHENYLEPHRINE HYDROCHLORIDE 100 MCG: 10 INJECTION INTRAVENOUS at 16:55

## 2024-03-28 RX ADMIN — DOCUSATE SODIUM 100 MG: 100 CAPSULE, LIQUID FILLED ORAL at 21:07

## 2024-03-28 RX ADMIN — PHENYLEPHRINE HYDROCHLORIDE 100 MCG: 10 INJECTION INTRAVENOUS at 17:25

## 2024-03-28 RX ADMIN — FENTANYL CITRATE 25 MCG: 50 INJECTION, SOLUTION INTRAMUSCULAR; INTRAVENOUS at 18:19

## 2024-03-28 RX ADMIN — CARVEDILOL 6.25 MG: 6.25 TABLET, FILM COATED ORAL at 07:36

## 2024-03-28 RX ADMIN — ATORVASTATIN CALCIUM 40 MG: 40 TABLET, FILM COATED ORAL at 21:07

## 2024-03-28 RX ADMIN — LIDOCAINE HYDROCHLORIDE 100 MG: 20 INJECTION, SOLUTION EPIDURAL; INFILTRATION; INTRACAUDAL at 16:56

## 2024-03-28 RX ADMIN — ROCURONIUM BROMIDE 40 MG: 50 INJECTION, SOLUTION INTRAVENOUS at 16:46

## 2024-03-28 RX ADMIN — HYDROMORPHONE HYDROCHLORIDE 1 MG: 1 INJECTION, SOLUTION INTRAMUSCULAR; INTRAVENOUS; SUBCUTANEOUS at 07:10

## 2024-03-28 RX ADMIN — SODIUM CHLORIDE, POTASSIUM CHLORIDE, SODIUM LACTATE AND CALCIUM CHLORIDE: 600; 310; 30; 20 INJECTION, SOLUTION INTRAVENOUS at 16:43

## 2024-03-28 RX ADMIN — ESMOLOL HYDROCHLORIDE 30 MG: 100 INJECTION, SOLUTION INTRAVENOUS at 16:50

## 2024-03-28 RX ADMIN — OXYCODONE 2.5 MG: 5 TABLET ORAL at 12:34

## 2024-03-28 RX ADMIN — PROPOFOL 150 MG: 10 INJECTION, EMULSION INTRAVENOUS at 16:46

## 2024-03-28 RX ADMIN — METOPROLOL TARTRATE 2 MG: 5 INJECTION INTRAVENOUS at 17:57

## 2024-03-28 ASSESSMENT — LIFESTYLE VARIABLES
HOW MANY TIMES IN THE PAST YEAR HAVE YOU HAD 5 OR MORE DRINKS IN A DAY: 0
ALCOHOL_USE: YES
HAVE YOU EVER FELT YOU SHOULD CUT DOWN ON YOUR DRINKING: NO
AVERAGE NUMBER OF DAYS PER WEEK YOU HAVE A DRINK CONTAINING ALCOHOL: 0
TOTAL SCORE: 0
TOTAL SCORE: 0
SUBSTANCE_ABUSE: 0
ON A TYPICAL DAY WHEN YOU DRINK ALCOHOL HOW MANY DRINKS DO YOU HAVE: 1
EVER HAD A DRINK FIRST THING IN THE MORNING TO STEADY YOUR NERVES TO GET RID OF A HANGOVER: NO
EVER FELT BAD OR GUILTY ABOUT YOUR DRINKING: NO
DOES PATIENT WANT TO STOP DRINKING: NO
HAVE PEOPLE ANNOYED YOU BY CRITICIZING YOUR DRINKING: NO
CONSUMPTION TOTAL: NEGATIVE
TOTAL SCORE: 0

## 2024-03-28 ASSESSMENT — COGNITIVE AND FUNCTIONAL STATUS - GENERAL
MOVING FROM LYING ON BACK TO SITTING ON SIDE OF FLAT BED: A LOT
DRESSING REGULAR LOWER BODY CLOTHING: TOTAL
TOILETING: A LOT
WALKING IN HOSPITAL ROOM: TOTAL
DAILY ACTIVITIY SCORE: 15
SUGGESTED CMS G CODE MODIFIER DAILY ACTIVITY: CK
CLIMB 3 TO 5 STEPS WITH RAILING: TOTAL
DRESSING REGULAR UPPER BODY CLOTHING: A LOT
SUGGESTED CMS G CODE MODIFIER MOBILITY: CM
MOBILITY SCORE: 8
TURNING FROM BACK TO SIDE WHILE IN FLAT BAD: TOTAL
MOVING TO AND FROM BED TO CHAIR: TOTAL
STANDING UP FROM CHAIR USING ARMS: A LOT
HELP NEEDED FOR BATHING: A LOT

## 2024-03-28 ASSESSMENT — PAIN DESCRIPTION - PAIN TYPE
TYPE: ACUTE PAIN
TYPE: SURGICAL PAIN
TYPE: SURGICAL PAIN
TYPE: ACUTE PAIN
TYPE: ACUTE PAIN
TYPE: SURGICAL PAIN
TYPE: ACUTE PAIN
TYPE: SURGICAL PAIN
TYPE: ACUTE PAIN
TYPE: ACUTE PAIN
TYPE: SURGICAL PAIN
TYPE: SURGICAL PAIN
TYPE: ACUTE PAIN
TYPE: SURGICAL PAIN

## 2024-03-28 ASSESSMENT — ENCOUNTER SYMPTOMS
NEUROLOGICAL NEGATIVE: 1
SINUS PAIN: 0
STRIDOR: 0
CONSTITUTIONAL NEGATIVE: 1
MYALGIAS: 0
DIZZINESS: 0
CHILLS: 0
ORTHOPNEA: 0
BLURRED VISION: 0
PHOTOPHOBIA: 0
POLYDIPSIA: 0
PALPITATIONS: 0
FOCAL WEAKNESS: 0
NECK PAIN: 0
TINGLING: 0
EYE PAIN: 0
HEARTBURN: 0
PND: 0
TREMORS: 0
DIARRHEA: 0
WHEEZING: 0
FLANK PAIN: 0
FALLS: 1
ABDOMINAL PAIN: 0
BACK PAIN: 0
CONSTIPATION: 0
CARDIOVASCULAR NEGATIVE: 1
BLOOD IN STOOL: 0
SPUTUM PRODUCTION: 0
DIAPHORESIS: 0
NAUSEA: 0
EYES NEGATIVE: 1
SORE THROAT: 0
BRUISES/BLEEDS EASILY: 0
GASTROINTESTINAL NEGATIVE: 1
HEADACHES: 0
DOUBLE VISION: 0
FALLS: 0
DEPRESSION: 0
WEAKNESS: 0
WEIGHT LOSS: 0
COUGH: 0
PSYCHIATRIC NEGATIVE: 1
VOMITING: 0
CLAUDICATION: 0
SHORTNESS OF BREATH: 0
HALLUCINATIONS: 0
HEMOPTYSIS: 0
RESPIRATORY NEGATIVE: 1
FEVER: 0

## 2024-03-28 ASSESSMENT — PATIENT HEALTH QUESTIONNAIRE - PHQ9
1. LITTLE INTEREST OR PLEASURE IN DOING THINGS: NOT AT ALL
SUM OF ALL RESPONSES TO PHQ9 QUESTIONS 1 AND 2: 0
2. FEELING DOWN, DEPRESSED, IRRITABLE, OR HOPELESS: NOT AT ALL

## 2024-03-28 ASSESSMENT — PAIN SCALES - GENERAL: PAIN_LEVEL: 1

## 2024-03-28 NOTE — ASSESSMENT & PLAN NOTE
Patient denies any other symptoms including back pain headache  Reports it was purely mechanical  PT and OT

## 2024-03-28 NOTE — ASSESSMENT & PLAN NOTE
Decreasing home bp meds due to mild hypotension, see above, following closely and adjusting as needed  Holding diuretics, see above

## 2024-03-28 NOTE — CARE PLAN
The patient is Watcher - Medium risk of patient condition declining or worsening    Shift Goals  Clinical Goals: Patients pain will be control post intervention as evidence by a pain rating of 3 or less this shift  Patient Goals: to have surgery  Family Goals: magdaleno    Progress made toward(s) clinical / shift goals:    Patient had surgery today    Problem: Pain - Standard  Goal: Alleviation of pain or a reduction in pain to the patient’s comfort goal  Outcome: Progressing  Well Managed with pain medication     Problem: Fall Risk  Goal: Patient will remain free from falls  Outcome: Progressing   No falls      Patient is not progressing towards the following goals:

## 2024-03-28 NOTE — H&P
Hospital Medicine History & Physical Note    Date of Service  3/28/2024    Primary Care Physician  No primary care provider on file.    Consultants  orthopedics    Specialist Names: Dr. Suero    Code Status  Full Code    Chief Complaint  Chief Complaint   Patient presents with    Fall     [place] she [place] the last step of the stairs and fell. +headstrike, on thinners for afib, -LOC. Pt reports L knee pain       History of Presenting Illness  Jaqui Sifuentes is a 88 y.o. female who presented 3/27/2024 with past medical history of COPD, chronic hypoxic respiratory failure on 2 L oxygen, A-fib, hypothyroidism, mild aortic stenosis who presents to the hospital for a fall.  Patient was walking downstairs when she had a misstep and collapsed.  She did hit her head but denies losing consciousness.  She denies lightheadedness or dizziness.  She denies any nausea, vomiting, diarrhea, shortness of breath, back pain, fevers.  Her only other complaint is left hand pain.  The patient does take Pradaxa and last took it 3/27 morning.    X-ray of the femur found communicated displaced distal femoral metaphyseal fracture  CT scan of the head was negative  EKG interpreted by me and found to be controlled A-fib    I discussed the plan of care with patient.    Review of Systems  Review of Systems   Constitutional:  Negative for chills, diaphoresis, fever and malaise/fatigue.   HENT:  Negative for congestion, ear discharge, ear pain, hearing loss, nosebleeds, sinus pain, sore throat and tinnitus.    Eyes:  Negative for blurred vision, double vision, photophobia and pain.   Respiratory:  Negative for cough, hemoptysis, sputum production, shortness of breath, wheezing and stridor.    Cardiovascular:  Negative for chest pain, palpitations, orthopnea, claudication, leg swelling and PND.   Gastrointestinal:  Negative for abdominal pain, blood in stool, constipation, diarrhea, heartburn, melena, nausea and vomiting.    Genitourinary:  Negative for dysuria, flank pain, frequency, hematuria and urgency.   Musculoskeletal:  Negative for back pain, falls, joint pain, myalgias and neck pain.   Skin:  Negative for itching and rash.   Neurological:  Negative for dizziness, tingling, tremors, weakness and headaches.   Endo/Heme/Allergies:  Negative for environmental allergies and polydipsia. Does not bruise/bleed easily.   Psychiatric/Behavioral:  Negative for depression, hallucinations, substance abuse and suicidal ideas.        Past Medical History   has a past medical history of COPD (chronic obstructive pulmonary disease) (HCC), Hypertension, and Hypothyroid.    Surgical History   has a past surgical history that includes lumpectomy (Right) and knee replacement, total (Left).     Family History  family history is not on file.   Family history reviewed with patient. There is no family history that is pertinent to the chief complaint.     Social History   reports that she has never smoked. She has never used smokeless tobacco. She reports current alcohol use. She reports that she does not use drugs.    Allergies  Allergies   Allergen Reactions    Vancomycin Rash     rash       Medications  Prior to Admission Medications   Prescriptions Last Dose Informant Patient Reported? Taking?   albuterol 108 (90 Base) MCG/ACT Aero Soln inhalation aerosol   Yes Yes   Sig: Inhale 2 Puffs as needed for Shortness of Breath.   aspirin 81 MG EC tablet   Yes Yes   Sig: Take 81 mg by mouth every morning.   atorvastatin (LIPITOR) 20 MG Tab   Yes Yes   Sig: Take 40 mg by mouth every evening.   budesonide-formoterol (SYMBICORT) 80-4.5 MCG/ACT Aerosol   Yes Yes   Sig: Inhale 2 Puffs 2 times a day.   carvedilol (COREG) 12.5 MG Tab   Yes Yes   Sig: Take 12.5 mg by mouth 2 times a day with meals.   dabigatran (PRADAXA) 150 MG Cap capsule   Yes Yes   Sig: Take 150 mg by mouth 2 times a day.   furosemide (LASIX) 80 MG Tab   Yes Yes   Sig: Take 80 mg by mouth  every morning.   levothyroxine (SYNTHROID) 175 MCG Tab   Yes Yes   Sig: Take 175 mcg by mouth every morning on an empty stomach.   lisinopril (PRINIVIL) 10 MG Tab   Yes Yes   Sig: Take 10 mg by mouth every morning.   montelukast (SINGULAIR) 10 MG Tab   Yes Yes   Sig: Take 10 mg by mouth every morning.   nitroglycerin (NITROSTAT) 0.4 MG SL Tab   Yes Yes   Sig: Place 0.4 mg under the tongue every 5 minutes as needed for Chest Pain.   ondansetron (ZOFRAN ODT) 4 MG TABLET DISPERSIBLE   Yes Yes   Sig: Take 4 mg by mouth every 6 hours as needed for Nausea/Vomiting.   spironolactone (ALDACTONE) 25 MG Tab   Yes Yes   Sig: Take 25 mg by mouth every morning. m      Facility-Administered Medications: None       Physical Exam  Temp:  [36.1 °C (96.9 °F)-36.2 °C (97.1 °F)] 36.2 °C (97.1 °F)  Pulse:  [] 91  Resp:  [18] 18  BP: (113-145)/(56-72) 140/72  SpO2:  [97 %-98 %] 98 %  Blood Pressure : 131/61   Temperature: 36.1 °C (96.9 °F)   Pulse: 100   Respiration: 18   Pulse Oximetry: 97 %       Physical Exam  Vitals and nursing note reviewed.   Constitutional:       General: She is not in acute distress.     Appearance: Normal appearance. She is not ill-appearing, toxic-appearing or diaphoretic.   HENT:      Head: Normocephalic and atraumatic.      Nose: No congestion or rhinorrhea.      Mouth/Throat:      Pharynx: No oropharyngeal exudate or posterior oropharyngeal erythema.   Eyes:      General: No scleral icterus.  Neck:      Vascular: No carotid bruit or JVD.   Cardiovascular:      Rate and Rhythm: Normal rate and regular rhythm.      Pulses: Normal pulses.      Heart sounds: Murmur heard.      No friction rub. No gallop.   Pulmonary:      Effort: Pulmonary effort is normal. No respiratory distress.      Breath sounds: No stridor. No wheezing, rhonchi or rales.   Abdominal:      General: Abdomen is flat. There is no distension.      Palpations: There is no mass.      Tenderness: There is no abdominal tenderness. There is no  "left CVA tenderness, guarding or rebound.      Hernia: No hernia is present.   Musculoskeletal:         General: No swelling. Normal range of motion.      Cervical back: No rigidity. No muscular tenderness.      Right lower leg: No edema.      Left lower leg: No edema.      Comments: Cast on left leg   Lymphadenopathy:      Cervical: No cervical adenopathy.   Skin:     General: Skin is warm and dry.      Capillary Refill: Capillary refill takes more than 3 seconds.      Coloration: Skin is not jaundiced or pale.      Findings: No bruising or erythema.   Neurological:      Mental Status: She is alert.         Laboratory:  Recent Labs     03/27/24 2110   WBC 9.5   RBC 3.17*   HEMOGLOBIN 10.1*   HEMATOCRIT 30.4*   MCV 95.9   MCH 31.9   MCHC 33.2   RDW 43.2   PLATELETCT 165   MPV 10.5     Recent Labs     03/27/24 2110   SODIUM 132*   POTASSIUM 4.9   CHLORIDE 97   CO2 25   GLUCOSE 114*   BUN 23*   CREATININE 1.31   CALCIUM 9.3     Recent Labs     03/27/24 2110   ALTSGPT 15   ASTSGOT 20   ALKPHOSPHAT 109*   TBILIRUBIN 0.4   GLUCOSE 114*     Recent Labs     03/27/24 2110   APTT 54.9*   INR 1.99*     No results for input(s): \"NTPROBNP\" in the last 72 hours.      No results for input(s): \"TROPONINT\" in the last 72 hours.    Imaging:  DX-KNEE 2- LEFT   Final Result         1.  Comminuted displaced distal femoral metaphyseal fracture      CT-HEAD W/O   Final Result         1.  No acute intracranial abnormality is identified, there are nonspecific white matter changes, commonly associated with small vessel ischemic disease.  Associated mild cerebral atrophy is noted.   2.  Atherosclerosis.             X-Ray:  I have personally reviewed the images and compared with prior images.  EKG:  I have personally reviewed the images and compared with prior images.    Assessment/Plan:  Justification for Admission Status  I anticipate this patient will require at least two midnights for appropriate medical management, necessitating " inpatient admission because closed fracture of distal femur    Patient will need a Med/Surg bed on ORTHOPEDICS service .  The need is secondary to closed fracture of distal femur.    * Closed fracture of distal end of femur (HCC)- (present on admission)  Assessment & Plan  Pain control with oral and IV narcotics  Npo at midnight   DVT prophylaxis after surgery  PT OT emilee  Orthopedics has been consulted    COPD (chronic obstructive pulmonary disease) (HCC)  Assessment & Plan  Not in exacerbation  Continue home inhalers    Encounter for preoperative assessment  Assessment & Plan  Admit to:    Orthopedic/Med-Surg floor since no major co-morbidities.     Cardiovascular:   Patient has history of CHF: Continue home beta blocker and rate control medications.   Pre-op EKG: Yes    Pulmonary:  Oxygen per protocol  Incentive Spirometer    GI:   No history of cirrhosis. Standard bowel regimen. Hold for loose stools.    Renal:   IV fluids: No fluids - risk of fluid overload    Labs: Metabolic Panel every 6 hours for 24 hours for significant electrolyte derangements    Musculoskeletal:   Check 25 OH vitamin D level. If 31-40 pg/mL, consider starting vitamin D3 1000 IU PO daily. If 20-30 pg/mL, consider vitamin D3 2000 IU PO daily. If <20 pg/mL, vitamin D2 50,000 IU weekly x 8 weeks then 2000 IU PO daily.    Neurologic:   Pain Control: Neuro checks every 4 hours  Avoid fentanyl (short-acting)    Hematologic:  Plan on pharmacologic DVT prophylaxis post operative day #1. Hold for decreasing hemoglobin. Notify provider for hemoglobin less than 8.  Order preoperative type and cross.   Hgb every 6 hours if high bleeding risk.   If patient was on anticoagulation prior to arrival risks and benefits will be weighed by teams including surgery, hospitalist, geriatrics, and anesthesia for delaying surgery more than 24 hours.   On anticoagulation prior to arrival: Yes: Significant preoperative anemia or INR greater than 1.5, draw hgb/hct  every 4 hours for 24 hours, INR daily for 2 days.     Medical Assessment Risk:  High    Surgical Risk:   Intermediate      Hypertension  Assessment & Plan  Continue home blood pressure medications  Hold diuretics    Aortic stenosis  Assessment & Plan  Mild that was found on echo in 2022    Chronic hypoxic respiratory failure (HCC)  Assessment & Plan  On 2 L of O2 which is her baseline    Atrial fibrillation (HCC)  Assessment & Plan  Rate controlled  Continue Coreg  Hold Pradaxa    Fall  Assessment & Plan  Patient denies any other symptoms including back pain headaches  PT OT eval        VTE prophylaxis: SCDs/TEDs

## 2024-03-28 NOTE — ASSESSMENT & PLAN NOTE
Admit to:    Orthopedic/Med-Surg floor since no major co-morbidities.     Cardiovascular:   Patient has history of CHF: Continue home beta blocker and rate control medications.   Pre-op EKG: Yes    Pulmonary:  Oxygen per protocol  Incentive Spirometer    GI:   No history of cirrhosis. Standard bowel regimen. Hold for loose stools.    Renal:   IV fluids: No fluids - risk of fluid overload    Labs: Metabolic Panel every 6 hours for 24 hours for significant electrolyte derangements    Musculoskeletal:   Check 25 OH vitamin D level. If 31-40 pg/mL, consider starting vitamin D3 1000 IU PO daily. If 20-30 pg/mL, consider vitamin D3 2000 IU PO daily. If <20 pg/mL, vitamin D2 50,000 IU weekly x 8 weeks then 2000 IU PO daily.    Neurologic:   Pain Control: Neuro checks every 4 hours  Avoid fentanyl (short-acting)    Hematologic:  Plan on pharmacologic DVT prophylaxis post operative day #1. Hold for decreasing hemoglobin. Notify provider for hemoglobin less than 8.  Order preoperative type and cross.   Hgb every 6 hours if high bleeding risk.   If patient was on anticoagulation prior to arrival risks and benefits will be weighed by teams including surgery, hospitalist, geriatrics, and anesthesia for delaying surgery more than 24 hours.   On anticoagulation prior to arrival: Yes: Significant preoperative anemia or INR greater than 1.5, draw hgb/hct every 4 hours for 24 hours, INR daily for 2 days.     Medical Assessment Risk:  High    Surgical Risk:   Intermediate

## 2024-03-28 NOTE — PROGRESS NOTES
Med rec complete per [facility]. Updated Lisinopril and Muscle relaxer.

## 2024-03-28 NOTE — THERAPY
03/28/24 1150   Interdisciplinary Plan of Care Collaboration   Collaboration Comments Hold PT eval pending sx

## 2024-03-28 NOTE — ASSESSMENT & PLAN NOTE
Rate controlled  Continue Coreg but decrease dose due to mild hypotension, following closely and adjusting dose as needed  Hold Pradaxa- currently due to acute renal failure, she is on ppd of renal dosed lovenox  Continue to follow

## 2024-03-28 NOTE — ASSESSMENT & PLAN NOTE
Pain control with oral and IV narcotics  S/p surgical repair with Dr Suero 3/28/24  DVT prophylaxis after surgery  PT OT  Pain control and supportive care

## 2024-03-28 NOTE — THERAPY
Occupational Therapy Contact Note    Patient Name: Jaqui Sifuentes  Age:  88 y.o., Sex:  female  Medical Record #: [id number]  Today's Date: 3/28/2024       03/28/24 1313   Treatment Variance   Reason For Missed Therapy Medical - Patient on Hold from Therapy   Interdisciplinary Plan of Care Collaboration   IDT Collaboration with  Nursing   Collaboration Comments Hold OT eval as pt is pending ortho and potential ORIF.   Session Information   Date / Session Number  3/28: HOLD

## 2024-03-28 NOTE — CARE PLAN
Problem: Skin Integrity  Goal: Skin integrity is maintained or improved  Outcome: Progressing     Problem: Pain - Standard  Goal: Alleviation of pain or a reduction in pain to the patient’s comfort goal  Outcome: Progressing     The patient is Stable - Low risk of patient condition declining or worsening    Shift Goals  Clinical Goals: patient's pain will be controlled this shift    Progress made toward(s) clinical / shift goals:  medicated for pain per MAR    Patient is not progressing towards the following goals:

## 2024-03-28 NOTE — PROGRESS NOTES
Med rec partially complete. RN will let me know Pt's pharmacy in [place] to update Lisinopril strength.

## 2024-03-28 NOTE — CONSULTS
3/28/2024    The patient was seen at the request of Dr Brooks    HPI: Jaqui Sifuentes is a 88 y.o. female who presents with complaints of pain to left knee.  This started yesterday  after fall.  The pain is 5/10 and is described as sharp.  The pain is made worse by palpation of the area and made better by rest and immobilization.    Past Medical History:   Diagnosis Date    COPD (chronic obstructive pulmonary disease) (HCC)     Hypertension     Hypothyroid        Past Surgical History:   Procedure Laterality Date    KNEE REPLACEMENT, TOTAL Left     LUMPECTOMY Right        Medications  No current facility-administered medications on file prior to encounter.     Current Outpatient Medications on File Prior to Encounter   Medication Sig Dispense Refill    orphenadrine (NORFLEX) 100 MG tablet Take 100 mg by mouth 2 times a day.      albuterol 108 (90 Base) MCG/ACT Aero Soln inhalation aerosol Inhale 2 Puffs as needed for Shortness of Breath.      atorvastatin (LIPITOR) 40 MG Tab Take 40 mg by mouth every evening.      carvedilol (COREG) 6.25 MG Tab Take 6.25 mg by mouth 2 times a day with meals.      dabigatran (PRADAXA) 150 MG Cap capsule Take 150 mg by mouth 2 times a day.      furosemide (LASIX) 40 MG Tab Take 80 mg by mouth every morning. 80 mg = 2 tablets      levothyroxine (SYNTHROID) 175 MCG Tab Take 175 mcg by mouth every morning on an empty stomach.      lisinopril (PRINIVIL) 5 MG Tab Take 5 mg by mouth every morning.      montelukast (SINGULAIR) 10 MG Tab Take 10 mg by mouth every morning.      nitroglycerin (NITROSTAT) 0.4 MG SL Tab Place 0.4 mg under the tongue every 5 minutes as needed for Chest Pain.      spironolactone (ALDACTONE) 25 MG Tab Take 25 mg by mouth every morning. m      loratadine (CLARITIN) 10 MG Tab Take 10 mg by mouth every day.      Multiple Vitamins-Minerals (PRESERVISION AREDS 2 PO) Take 1 Capsule by mouth 2 times a day.         Allergies  Vancomycin    ROS  Left knee pain. All other  "systems were reviewed and found to be negative    History reviewed. No pertinent family history.    Social History     Socioeconomic History    Marital status:    Tobacco Use    Smoking status: Never    Smokeless tobacco: Never   Substance and Sexual Activity    Alcohol use: Yes     Comment: occ    Drug use: Never       Physical Exam  Vitals  /63   Pulse (!) 106   Temp 37.8 °C (100.1 °F) (Temporal)   Resp 16   Ht 1.651 m (5' 5\")   Wt 108 kg (237 lb 7 oz)   SpO2 97%   General: Well Developed, Well Nourished, Age appropriate appearance  HEENT: Normocephalic, atraumatic  Psych: Normal mood and affect  Neck: Supple, nontender, no masses  Lungs: Breathing unlabored, No audible wheezing  Heart: Regular heart rate and rhythm  Abdomen: Soft, NT, ND  Neuro: Sensation grossly intact to BUE and BLE, moving all four extremities  Skin: Intact, no open wounds  Vascular: 2+DP/PT, Capillary refill <2 seconds  MSK: Left knee pain and deformity      Radiographs:  Left distal femur periprosthetic fracture  DX-KNEE 2- LEFT   Final Result         1.  Comminuted displaced distal femoral metaphyseal fracture      CT-HEAD W/O   Final Result         1.  No acute intracranial abnormality is identified, there are nonspecific white matter changes, commonly associated with small vessel ischemic disease.  Associated mild cerebral atrophy is noted.   2.  Atherosclerosis.         DX-PORTABLE FLUOROSCOPY < 1 HOUR    (Results Pending)   DX-FEMUR-2+ LEFT    (Results Pending)       Laboratory Values  Recent Labs     03/27/24 2110 03/28/24  0311   WBC 9.5 10.9*   RBC 3.17* 3.09*   HEMOGLOBIN 10.1* 9.7*   HEMATOCRIT 30.4* 29.6*   MCV 95.9 95.8   MCH 31.9 31.4   MCHC 33.2 32.8   RDW 43.2 43.6   PLATELETCT 165 173   MPV 10.5 11.4     Recent Labs     03/27/24 2110 03/28/24  0311   SODIUM 132* 134*   POTASSIUM 4.9 4.8   CHLORIDE 97 96   CO2 25 23   GLUCOSE 114* 125*   BUN 23* 24*     Recent Labs     03/27/24 2110   APTT 54.9*   INR " 1.99*         Impression:Left distal femur fracture    Plan:We discussed the diagnosis and findings with the patient at length.  We reviewed possible non operative and operative interventions and the risks and benefits of each of these.  she had a chance to ask questions and all of these were answered to her satisfaction. The patient chose to proceed with  operative fixation including all indicated procedures. Risks and benefits of surgery were discussed which include but are not limited to bleeding, infection, neurovascular damage, malunion, nonunion, instability, limb length discrepancy, DVT, PE, MI, Stroke and death. They understand these risks and wish to proceed.

## 2024-03-28 NOTE — ED TRIAGE NOTES
Pt was BIB EMS as a TBI for  Chief Complaint   Patient presents with    Fall     [place] last step of the stairs and fell. +headstrike, on thinners for afib, -LOC. Pt reports L knee pain       Pt is A&Ox4, GCS=15. Small abrasion to L knee present. Hx of L knee replacement. Takes Pradaxa for afib and daily aspirin. Wears 2L of O2 baseline. Pt taken to CT by this RN then to blue 22. Report to MAYTE Aguilar.

## 2024-03-28 NOTE — ANESTHESIA PREPROCEDURE EVALUATION
Case: [id number] Date/Time: 03/28/24 1437    Procedure: ORIF, FRACTURE, FEMUR-DISTAL (Left)    Pre-op diagnosis: left periprosthetic distal femur fx    [facility]    Surgeons: Johan Suero M.D.          Brief Past Medical History    Anesthesia: No Problems with Anesthesia  Cards: No History of CHF, or Stents.   EKG: Chronic A-fib, rate controlled  ECHO: ECHO 8/2022: LVEF 55-60%, TPASE 1.6cm noted. Mild AS with gradient of 14, mild tricuspid regurg. No pulmonary hypertension noted.   Resp: No Asthma. +COPD with chronic resp failure on 2L O2.   GI: No Daily Symptomatic GERD. NPO per guidelines.  Neuro: No History of CVA , TIA, or Seizures.   Endo: No History of Diabetes  Renal: CKD EGFR <40  MSK: No active problems    Relevant Problems   PULMONARY   (positive) COPD (chronic obstructive pulmonary disease) (HCC)      CARDIAC   (positive) Aortic stenosis   (positive) Atrial fibrillation (HCC)   (positive) Hypertension       Physical Exam    Airway   Mallampati: II  TM distance: >3 FB  Neck ROM: full       Cardiovascular - normal exam  Rhythm: regular  Rate: normal  (-) murmur     Dental - normal exam           Pulmonary - normal exam  Breath sounds clear to auscultation     Abdominal    Neurological - normal exam                   Anesthesia Plan    ASA 3   ASA physical status 3 criteria: COPD - poorly controlled    Plan - general       Airway plan will be ETT          Induction: intravenous    Postoperative Plan: Postoperative administration of opioids is intended.    Pertinent diagnostic labs and testing reviewed    Informed Consent:    Anesthetic plan and risks discussed with patient.    Use of blood products discussed with: patient whom consented to blood products.

## 2024-03-28 NOTE — PROGRESS NOTES
4 Eyes Skin Assessment Completed by MAYTE Evans and MAYTE Dennis.    Head WDL  Ears WDL  Nose WDL  Mouth WDL  Neck WDL  Breast/Chest WDL  Shoulder Blades WDL  Spine WDL  (R) Arm/Elbow/Hand Abrasion  (L) Arm/Elbow/Hand Edema  Abdomen WDL  Groin WDL  Scrotum/Coccyx/Buttocks DERREK, patient unable to turn  (R) Leg LE discoloration  (L) Leg femur fracture immobilizer in place  (R) Heel/Foot/Toe Redness  (L) Heel/Foot/Toe WDL          Devices In Places Leg Immobilizer and Nasal Cannula      Interventions In Place NC W/Ear Foams and Pillows    Possible Skin Injury No    Pictures Uploaded Into Epic Yes  Wound Consult Placed N/A  RN Wound Prevention Protocol Ordered No

## 2024-03-28 NOTE — PROGRESS NOTES
[facility] Medicine Daily Progress Note    Date of Service  3/28/2024    Chief Complaint  Jaqui Sifuentes is a 88 y.o. female admitted 3/27/2024 with leg pain after a mechanical fall    Hospital Course  Jaqui Sifuentes is a 88 y.o. female who presented to [facility] after a ground level fall on 3/27/2024. She has a past medical history of COPD, chronic hypoxic respiratory failure on 2 L oxygen, A-fib, hypothyroidism,and  mild aortic stenosis. She was walking down stairs when she had a misstep and collapsed.  She did hit her head but denied losing consciousness.  She denied lightheadedness or dizziness.  She deniesd any nausea, vomiting, diarrhea, shortness of breath, back pain, fevers.  Her only other complaint was left hand pain.  The patient does take Pradaxa and last took it 3/27 morning.     X-ray of the femur found communicated displaced distal femoral metaphyseal fracture  CT scan of the head was negative  EKG found to be controlled A-fib    Interval Problem Update  Axox3, daughter at beside, she reports no pain currently but does have sharp intermittent pain and spasms. No numbness or tingling. Sensation and pulses intact. I discussed with orthopedics, they are looking into OR time. Code status clarified, dnr/ dni    I have discussed this patient's plan of care and discharge plan at IDT rounds today with Case Management, Nursing, Nursing leadership, and other members of the IDT team.    Consultants/Specialty  [place]    Code Status  Full Code    Disposition  The patient is not medically cleared for discharge to home or a post-acute facility.      I have placed the appropriate orders for post-discharge needs.    Review of Systems  Review of Systems   Constitutional: Negative.  Negative for chills, diaphoresis, fever, malaise/fatigue and weight loss.   HENT: Negative.  Negative for sore throat.    Eyes: Negative.  Negative for blurred vision.   Respiratory: Negative.  Negative for cough and shortness of  breath.    Cardiovascular: Negative.  Negative for chest pain, palpitations and leg swelling.   Gastrointestinal: Negative.  Negative for abdominal pain, nausea and vomiting.   Genitourinary: Negative.  Negative for dysuria.   Musculoskeletal:  Positive for falls and joint pain. Negative for myalgias.   Skin: Negative.  Negative for itching and rash.   Neurological: Negative.  Negative for dizziness, focal weakness, weakness and headaches.   Endo/Heme/Allergies: Negative.  Does not bruise/bleed easily.   Psychiatric/Behavioral: Negative.  Negative for depression, substance abuse and suicidal ideas.    All other systems reviewed and are negative.       Physical Exam  Temp:  [36.1 °C (96.9 °F)-36.8 °C (98.3 °F)] 36.8 °C (98.3 °F)  Pulse:  [] 100  Resp:  [17-18] 18  BP: (113-145)/(56-73) 141/73  SpO2:  [97 %-100 %] 97 %    Physical Exam  Vitals and nursing note reviewed. Exam conducted with a chaperone present.   Constitutional:       General: She is not in acute distress.     Appearance: Normal appearance. She is not diaphoretic.   HENT:      Head: Normocephalic.      Nose: Nose normal.      Mouth/Throat:      Mouth: Mucous membranes are moist.   Eyes:      Pupils: Pupils are equal, round, and reactive to light.   Cardiovascular:      Rate and Rhythm: Normal rate and regular rhythm.      Pulses: Normal pulses.      Heart sounds: Normal heart sounds.   Pulmonary:      Effort: Pulmonary effort is normal.      Breath sounds: Normal breath sounds.   Abdominal:      General: Abdomen is flat. Bowel sounds are normal.      Palpations: Abdomen is soft.   Musculoskeletal:         General: No swelling or deformity. Normal range of motion.      Comments: L leg in brace, sensation and pulses intact,    Skin:     General: Skin is warm and dry.      Capillary Refill: Capillary refill takes less than 2 seconds.   Neurological:      General: No focal deficit present.      Mental Status: She is alert and oriented to person,  place, and time.      Cranial Nerves: No cranial nerve deficit.   Psychiatric:         Mood and Affect: Mood normal.         Behavior: Behavior normal.         Fluids  No intake or output data in the 24 hours ending 03/28/24 1202    Laboratory  Recent Labs     03/27/24 2110 03/28/24  0311   WBC 9.5 10.9*   RBC 3.17* 3.09*   HEMOGLOBIN 10.1* 9.7*   HEMATOCRIT 30.4* 29.6*   MCV 95.9 95.8   MCH 31.9 31.4   MCHC 33.2 32.8   RDW 43.2 43.6   PLATELETCT 165 173   MPV 10.5 11.4     Recent Labs     03/27/24 2110 03/28/24  0311   SODIUM 132* 134*   POTASSIUM 4.9 4.8   CHLORIDE 97 96   CO2 25 23   GLUCOSE 114* 125*   BUN 23* 24*   CREATININE 1.31 1.37   CALCIUM 9.3 9.6     Recent Labs     03/27/24 2110   APTT 54.9*   INR 1.99*               Imaging  DX-KNEE 2- LEFT   Final Result         1.  Comminuted displaced distal femoral metaphyseal fracture      CT-HEAD W/O   Final Result         1.  No acute intracranial abnormality is identified, there are nonspecific white matter changes, commonly associated with small vessel ischemic disease.  Associated mild cerebral atrophy is noted.   2.  Atherosclerosis.              Assessment/Plan  * Closed fracture of distal end of femur (HCC)- (present on admission)  Assessment & Plan  Pain control with oral and IV narcotics  Npo awaiting surgery, discussed with Ortho Juju Conte  DVT prophylaxis after surgery  PT OT  Pain control and supportive care    COPD (chronic obstructive pulmonary disease) (HCC)  Assessment & Plan  Not in exacerbation  Continue home inhalers    Encounter for preoperative assessment  Assessment & Plan  Admit to:    Orthopedic/Med-Surg floor since no major co-morbidities.     Cardiovascular:   Patient has history of CHF: Continue home beta blocker and rate control medications.   Pre-op EKG: Yes    Pulmonary:  Oxygen per protocol  Incentive Spirometer    GI:   No history of cirrhosis. Standard bowel regimen. Hold for loose stools.    Renal:   IV fluids: No fluids -  risk of fluid overload    Labs: Metabolic Panel every 6 hours for 24 hours for significant electrolyte derangements    Musculoskeletal:   Check 25 OH vitamin D level. If 31-40 pg/mL, consider starting vitamin D3 1000 IU PO daily. If 20-30 pg/mL, consider vitamin D3 2000 IU PO daily. If <20 pg/mL, vitamin D2 50,000 IU weekly x 8 weeks then 2000 IU PO daily.    Neurologic:   Pain Control: Neuro checks every 4 hours  Avoid fentanyl (short-acting)    Hematologic:  Plan on pharmacologic DVT prophylaxis post operative day #1. Hold for decreasing hemoglobin. Notify provider for hemoglobin less than 8.  Order preoperative type and cross.   Hgb every 6 hours if high bleeding risk.   If patient was on anticoagulation prior to arrival risks and benefits will be weighed by teams including surgery, hospitalist, geriatrics, and anesthesia for delaying surgery more than 24 hours.   On anticoagulation prior to arrival: Yes: Significant preoperative anemia or INR greater than 1.5, draw hgb/hct every 4 hours for 24 hours, INR daily for 2 days.     Medical Assessment Risk:  High    Surgical Risk:   Intermediate      Hypertension  Assessment & Plan  Continue home blood pressure medications  Hold diuretics    Aortic stenosis  Assessment & Plan  Mild that was found on echo in 2022    Chronic hypoxic respiratory failure (HCC)  Assessment & Plan  On 2 L of O2 which is her baseline  No evidence of acute exacerbation    Atrial fibrillation (HCC)  Assessment & Plan  Rate controlled  Continue Coreg  Hold Pradaxa for surgery    Fall  Assessment & Plan  Patient denies any other symptoms including back pain headache  Reports it was purely mechanical  PT and OT           VTE prophylaxis: pradaxa held for surgery    I have performed a physical exam and reviewed and updated ROS and Plan today (3/28/2024). In review of yesterday's note (3/27/2024), there are no changes except as documented above.

## 2024-03-28 NOTE — ED NOTES
Pt transferred out of ED at this time with pt transport. Pt is A&Ox4, with stable vital signs and no apparent distress upon transfer.  All paperwork and personal belongings sent with pt.   Report given to transporter.     Pt Transferred on 2L O2 with adequate O2 volume in bed tank.

## 2024-03-28 NOTE — ANESTHESIA PROCEDURE NOTES
Airway    Date/Time: 3/28/2024 4:47 PM    Performed by: Clif Brown M.D.  Authorized by: Clif Brown M.D.    Location:  OR  Urgency:  Elective  Indications for Airway Management:  Anesthesia      Spontaneous Ventilation: absent    Sedation Level:  Deep  Preoxygenated: Yes    Patient Position:  Sniffing  Final Airway Type:  Endotracheal airway  Final Endotracheal Airway:  ETT  Cuffed: Yes    Technique Used for Successful ETT Placement:  Direct laryngoscopy    Insertion Site:  Oral  Blade Type:  Tien  Laryngoscope Blade/Videolaryngoscope Blade Size:  3  ETT Size (mm):  7.0  Measured from:  Teeth  ETT to Teeth (cm):  23  Placement Verified by: auscultation and capnometry    Cormack-Lehane Classification:  Grade I - full view of glottis  Number of Attempts at Approach:  1

## 2024-03-28 NOTE — ED PROVIDER NOTES
CHIEF COMPLAINT  Chief Complaint   Patient presents with    Fall     [place] last step of the stairs and fell. +headstrike, on thinners for afib, -LOC. Pt reports L knee pain       LIMITATION TO HISTORY   Select:     HPI    Jaqui Sifuentes is a 88 y.o. female who presents to the Emergency Department for left knee pain and trauma to the head patient presenting as a code TBI she takes Pradaxa for chronic atrial fibrillation last taken at 6 AM today, she reports she [place] she missed the last step causing her to fall to the ground she thinks she might of struck her head had no loss of consciousness reports left knee pain    OUTSIDE HISTORIAN(S):  Select:    EXTERNAL RECORDS REVIEWED  Select:       PAST MEDICAL HISTORY  Past Medical History:   Diagnosis Date    COPD (chronic obstructive pulmonary disease) (HCC)     Hypertension     Hypothyroid      .    SURGICAL HISTORY  Past Surgical History:   Procedure Laterality Date    KNEE REPLACEMENT, TOTAL Left     LUMPECTOMY Right          FAMILY HISTORY  History reviewed. No pertinent family history.       SOCIAL HISTORY  Social History     Socioeconomic History    Marital status: Not on file     Spouse name: Not on file    Number of children: Not on file    Years of education: Not on file    Highest education level: Not on file   Occupational History    Not on file   Tobacco Use    Smoking status: Never    Smokeless tobacco: Never   Substance and Sexual Activity    Alcohol use: Yes     Comment: occ    Drug use: Never    Sexual activity: Not on file   Other Topics Concern    Not on file   Social History Narrative    Not on file     Social Determinants of Health     Financial Resource Strain: Not on file   Food Insecurity: Not on file   Transportation Needs: Not on file   Physical Activity: Not on file   Stress: Not on file   Social Connections: Not on file   Intimate Partner Violence: Not on file   Housing  "Stability: Not on file         CURRENT MEDICATIONS  No current facility-administered medications on file prior to encounter.     Current Outpatient Medications on File Prior to Encounter   Medication Sig Dispense Refill    albuterol 108 (90 Base) MCG/ACT Aero Soln inhalation aerosol Inhale 2 Puffs as needed for Shortness of Breath.      atorvastatin (LIPITOR) 40 MG Tab Take 40 mg by mouth every evening.      carvedilol (COREG) 6.25 MG Tab Take 6.25 mg by mouth 2 times a day with meals.      dabigatran (PRADAXA) 150 MG Cap capsule Take 150 mg by mouth 2 times a day.      furosemide (LASIX) 40 MG Tab Take 80 mg by mouth every morning. 80 mg = 2 tablets      levothyroxine (SYNTHROID) 175 MCG Tab Take 175 mcg by mouth every morning on an empty stomach.      LISINOPRIL PO Take 1 Tablet by mouth every morning.      montelukast (SINGULAIR) 10 MG Tab Take 10 mg by mouth every morning.      nitroglycerin (NITROSTAT) 0.4 MG SL Tab Place 0.4 mg under the tongue every 5 minutes as needed for Chest Pain.      spironolactone (ALDACTONE) 25 MG Tab Take 25 mg by mouth every morning. m      loratadine (CLARITIN) 10 MG Tab Take 10 mg by mouth every day.      Multiple Vitamins-Minerals (PRESERVISION AREDS 2 PO) Take 1 Capsule by mouth 2 times a day.      NON SPECIFIED Take 1 Tablet by mouth every 12 hours. Unknown muscle relaxer             ALLERGIES  Allergies   Allergen Reactions    Vancomycin Rash     rash       PHYSICAL EXAM  VITAL SIGNS:/56   Pulse 95   Temp 36.1 °C (96.9 °F) (Temporal)   Resp 18   Ht 1.651 m (5' 5\")   Wt 102 kg (225 lb)   SpO2 97%   BMI 37.44 kg/m²       GENERAL: Awake and alert  HEAD: Normocephalic and atraumatic  NECK: Normal range of motion, without meningismus  EYES: Pupils Equal, Round, Reactive to Light, extraocular movements intact, conjunctiva white  ENT: Mucous membranes moist, oropharynx clear  PULMONARY: Normal effort, clear to auscultation  CARDIOVASCULAR: No murmurs, clicks or rubs, " peripheral pulses 2+, DP and posterior tibialis pulse are 2+ in the left foot  ABDOMINAL: Soft, non-tender, no guarding or rigidity present, no pulsatile masses  BACK: no midline tenderness, no costovertebral tenderness  NEUROLOGICAL: Grossly non-focal neurological examination, speech normal, gait normal  EXTREMITIES: Tenderness palpation left knee   SKIN: Warm and dry.  PSYCHIATRIC: Affect is appropriate    DIAGNOSTIC STUDIES / PROCEDURES  EKG  EKG Interpretation: I independently reviewed the below EKG and did not see signs of a STEMI  Results for orders placed or performed during the hospital encounter of 24   EKG   Result Value Ref Range    Report       [facility] Emergency Dept.    Test Date:  2024  Pt Name:    LINDA ONOFRE               Department: ER  MRN:        [id number]                      Room:        22  Gender:     Female                       Technician: [id number]  :        [id number]                   Requested By:JEROD MONTES  Order #:    [id number]                    Reading MD: Jerod Montes    Measurements  Intervals                                Axis  Rate:       85                           P:          0  ND:         0                            QRS:        -3  QRSD:       111                          T:          19  QT:         368  QTc:        438    Interpretive Statements  Atrial fibrillation  Low voltage, precordial leads  RSR' in V1 or V2, right VCD or RVH  No previous ECG available for comparison  Electronically Signed On 2024 22:51:20 PDT by Jerod Montes         Cardiac Monitor Interpretation:    Time: 10:30 PM  The cardiac monitor revealed normal sinus rhythm as interpreted by me.  The cardiac monitor was ordered secondary to the patient´s history of atrial fibrillation to monitor the patient for dysrhythmia    Splint Application  At 10:30 PM  Splint was applied to the left knee  Circulation, sensation, and motion were intact post-splint  application    LABS  Labs Reviewed   CBC WITH DIFFERENTIAL - Abnormal; Notable for the following components:       Result Value    RBC 3.17 (*)     Hemoglobin 10.1 (*)     Hematocrit 30.4 (*)     Neutrophils-Polys 77.60 (*)     Lymphocytes 13.70 (*)     All other components within normal limits   COMP METABOLIC PANEL - Abnormal; Notable for the following components:    Sodium 132 (*)     Glucose 114 (*)     Bun 23 (*)     Alkaline Phosphatase 109 (*)     All other components within normal limits   APTT - Abnormal; Notable for the following components:    APTT 54.9 (*)     All other components within normal limits   PROTHROMBIN TIME - Abnormal; Notable for the following components:    PT 22.8 (*)     INR 1.99 (*)     All other components within normal limits   ESTIMATED GFR - Abnormal; Notable for the following components:    GFR (CKD-EPI) 39 (*)     All other components within normal limits         RADIOLOGY  I independently reviewed and interpreted the images obtained today in the ER.  Distal left femur fracture    Radiologist interpretation:   DX-KNEE 2- LEFT   Final Result         1.  Comminuted displaced distal femoral metaphyseal fracture      CT-HEAD W/O   Final Result         1.  No acute intracranial abnormality is identified, there are nonspecific white matter changes, commonly associated with small vessel ischemic disease.  Associated mild cerebral atrophy is noted.   2.  Atherosclerosis.              COURSE & MEDICAL DECISION MAKING    ED COURSE:        INTERVENTIONS BY ME:  Medications   morphine 4 MG/ML injection 4 mg (has no administration in time range)   acetaminophen (Tylenol) tablet 650 mg (has no administration in time range)   ondansetron (Zofran) syringe/vial injection 4 mg (has no administration in time range)   ondansetron (Zofran ODT) dispertab 4 mg (has no administration in time range)   Pharmacy Consult Request ...Pain Management Review 1 Each (has no administration in time range)    oxyCODONE immediate-release (Roxicodone) tablet 2.5 mg (has no administration in time range)     Or   oxyCODONE immediate-release (Roxicodone) tablet 5 mg (has no administration in time range)     Or   HYDROmorphone (Dilaudid) injection 0.25 mg (has no administration in time range)   morphine 4 MG/ML injection 4 mg (4 mg Intravenous Given 3/27/24 2115)   ondansetron (Zofran) syringe/vial injection 4 mg (4 mg Intravenous Given 3/27/24 2114)       Response on recheck:      INITIAL ASSESSMENT, COURSE AND PLAN  Care Narrative:   Patient presenting after what was described as a mechanical ground-level fall head to toe trauma evaluation notable for trauma to the left knee given her head strike and presence of blood thinning medication CT scan of the head obtained which was negative.  Patient found to have a displaced distal femur fracture neurovascularly intact no evidence of compartment syndrome placed in splint.  Spoke to orthopedic surgeon with plan for operative repair tomorrow.  Blood work EKG and cardiac monitor interpretation without any significant derangements or signs of a metabolic or cardiac etiology to her fall.           ADDITIONAL PROBLEM LIST    DISPOSITION AND DISCUSSIONS  Discussion of management with other [facility] or appropriate source(s): None     I have discussed management of the patient with the following physicians and LAURA's: Spoke to Dr. Mccullough from orthopedic surgeon, spoke about presence of Pradaxa and plan for operative repair tomorrow    I disccused the mangament and decision to admit this patient with the Hospitalist. Dr. Crowe        FINAL DIAGNOSIS  1. Closed head injury, initial encounter    2. Fall, initial encounter    3. Closed bicondylar fracture of left femur, initial encounter (HCC)             Electronically signed by: Jerod Briceno DO ,10:51 PM 03/27/24

## 2024-03-28 NOTE — RESPIRATORY CARE
"COPD EDUCATION by COPD CLINICAL EDUCATOR  3/28/2024  at  9:57 AM by Zoey Salazar, RRT     Patient and her daughter were interviewed by education team.  Patient declined to participate in the full program.  Therefore, a short intervention has been conducted.  A comprehensive packet including information about COPD, types of treatments to manage their disease and safe home Oxygen usage was provided and reviewed with patient at the bedside. We reviewed difference/use of Portable Oxygen device versus her concentrator.    COPD Assessment  COPD Clinical Specialists ONLY  COPD Education Initiated: Yes--Short Intervention (met with pt and daughter visiting from [facility] with fracture; has LORENA and ? COPD ? / Asthma post Covid Pulmonary issues; on Rescue inhaler and Oxygen (stopped Symbicort) with A-fib; described sleep testing and inability to wear Mask; quit >30y)  Is this a COPD exacerbation patient?: No  DME Company: [employer] in [place]  DME Equipment Type: 2lpm has [employer] POC  Physician Name: [place]  Pulmonologist Name: [place]  $ Demo/Eval of SVN's, MDI's and Aerosols: Yes (reviewed use of inhaler and Oxygen devices and differences)  PFT Results  No results found for: \"PFT\"  (OP) Pulmonary Function Testing:  (none and denies tesating when reviewing pictures)  Interdisciplinary Rounds: Attendance at Rounds (30 Min)    Meds to Beds  [facility] provides bedside medication delivery for all eligible patients at discharge.  Would you like to opt out of this program for any reason?: No - Stay Opted In     MY COPD ACTION PLAN     It is recommended that patients and physicians /healthcare providers complete this action plan together. This plan should be discussed at each physician visit and updated as needed.    The green, yellow and red zones show groups of symptoms of COPD. This list of symptoms is not comprehensive, and you may experience other symptoms. In the \"Actions\" column, your healthcare provider has recommended actions " "for you to take based on your symptoms.    Patient Name: Jaqui Sifuentes   YOB: 1935   Last Updated on: 3/28/2024  9:57 AM   Green Zone:  I am doing well today Actions     Usual activitiy and exercise level   Take daily medications     Usual amounts of cough and phlegm/mucus   Use oxygen as prescribed     Sleep well at night   Continue regular exercise/diet plan     Appetite is good   At all times avoid cigarette smoke, inhaled irritants     Daily Medications (these medications are taken every day):                Yellow Zone:  I am having a bad day or a COPD flare Actions     More breathless than usual   Continue daily medications     I have less energy for my daily activities   Use quick relief inhaler as ordered     Increased or thicker phlegm/mucus   Use oxygen as prescribed     Using quick relief inhaler/nebulizer more often   Get plenty of rest     Swelling of ankles more than usual   Use pursed lip breathing     More coughing than usual   At all times avoid cigarette smoke, inhaled irritants     I feel like I have a \"chest cold\"     Poor sleep and my symptoms woke me up     My appetite is not good     My medicine is not helping      Call provider immediately if symptoms don’t improve     Continue daily medications, add rescue medications:   Albuterol 2 Puffs Every 4 hours PRN       Medications to be used during a flare up, (as Discussed with Provider):           Additional Information:  Provided instruction and use declined spacer    Red Zone:  I need urgent medical care Actions     Severe shortness of breath even at rest   Call 911 or seek medical care immediately     Not able to do any activity because of breathing      Fever or shaking chills      Feeling confused or very drowsy       Chest pains      Coughing up blood                  "

## 2024-03-29 ENCOUNTER — APPOINTMENT (OUTPATIENT)
Dept: RADIOLOGY | Facility: MEDICAL CENTER | Age: 89
DRG: 480 | End: 2024-03-29
Attending: HOSPITALIST
Payer: COMMERCIAL

## 2024-03-29 PROBLEM — E87.1 HYPONATREMIA: Status: ACTIVE | Noted: 2024-03-29

## 2024-03-29 PROBLEM — R11.2 NAUSEA AND VOMITING: Status: ACTIVE | Noted: 2024-03-29

## 2024-03-29 PROBLEM — N17.9 ACUTE RENAL FAILURE (ARF) (HCC): Status: ACTIVE | Noted: 2024-03-29

## 2024-03-29 LAB
ANION GAP SERPL CALC-SCNC: 12 MMOL/L (ref 7–16)
BASOPHILS # BLD AUTO: 0.2 % (ref 0–1.8)
BASOPHILS # BLD: 0.02 K/UL (ref 0–0.12)
BUN SERPL-MCNC: 37 MG/DL (ref 8–22)
CALCIUM SERPL-MCNC: 8.8 MG/DL (ref 8.5–10.5)
CHLORIDE SERPL-SCNC: 95 MMOL/L (ref 96–112)
CK SERPL-CCNC: 148 U/L (ref 0–154)
CO2 SERPL-SCNC: 22 MMOL/L (ref 20–33)
CREAT SERPL-MCNC: 2.15 MG/DL (ref 0.5–1.4)
EOSINOPHIL # BLD AUTO: 0 K/UL (ref 0–0.51)
EOSINOPHIL NFR BLD: 0 % (ref 0–6.9)
ERYTHROCYTE [DISTWIDTH] IN BLOOD BY AUTOMATED COUNT: 49.1 FL (ref 35.9–50)
GFR SERPLBLD CREATININE-BSD FMLA CKD-EPI: 22 ML/MIN/1.73 M 2
GLUCOSE SERPL-MCNC: 171 MG/DL (ref 65–99)
HCT VFR BLD AUTO: 27.3 % (ref 37–47)
HGB BLD-MCNC: 9.1 G/DL (ref 12–16)
IMM GRANULOCYTES # BLD AUTO: 0.07 K/UL (ref 0–0.11)
IMM GRANULOCYTES NFR BLD AUTO: 0.6 % (ref 0–0.9)
LYMPHOCYTES # BLD AUTO: 0.86 K/UL (ref 1–4.8)
LYMPHOCYTES NFR BLD: 7.2 % (ref 22–41)
MCH RBC QN AUTO: 30.6 PG (ref 27–33)
MCHC RBC AUTO-ENTMCNC: 33.3 G/DL (ref 32.2–35.5)
MCV RBC AUTO: 91.9 FL (ref 81.4–97.8)
MONOCYTES # BLD AUTO: 0.71 K/UL (ref 0–0.85)
MONOCYTES NFR BLD AUTO: 5.9 % (ref 0–13.4)
NEUTROPHILS # BLD AUTO: 10.33 K/UL (ref 1.82–7.42)
NEUTROPHILS NFR BLD: 86.1 % (ref 44–72)
NRBC # BLD AUTO: 0 K/UL
NRBC BLD-RTO: 0 /100 WBC (ref 0–0.2)
PLATELET # BLD AUTO: 132 K/UL (ref 164–446)
PMV BLD AUTO: 11 FL (ref 9–12.9)
POTASSIUM SERPL-SCNC: 5.3 MMOL/L (ref 3.6–5.5)
RBC # BLD AUTO: 2.97 M/UL (ref 4.2–5.4)
SODIUM SERPL-SCNC: 129 MMOL/L (ref 135–145)
WBC # BLD AUTO: 12 K/UL (ref 4.8–10.8)

## 2024-03-29 PROCEDURE — 700102 HCHG RX REV CODE 250 W/ 637 OVERRIDE(OP): Performed by: ORTHOPAEDIC SURGERY

## 2024-03-29 PROCEDURE — 700102 HCHG RX REV CODE 250 W/ 637 OVERRIDE(OP): Performed by: HOSPITALIST

## 2024-03-29 PROCEDURE — 770006 HCHG ROOM/CARE - MED/SURG/GYN SEMI*

## 2024-03-29 PROCEDURE — A9270 NON-COVERED ITEM OR SERVICE: HCPCS | Performed by: HOSPITALIST

## 2024-03-29 PROCEDURE — A9270 NON-COVERED ITEM OR SERVICE: HCPCS | Performed by: NURSE PRACTITIONER

## 2024-03-29 PROCEDURE — 97535 SELF CARE MNGMENT TRAINING: CPT

## 2024-03-29 PROCEDURE — 97530 THERAPEUTIC ACTIVITIES: CPT

## 2024-03-29 PROCEDURE — 36415 COLL VENOUS BLD VENIPUNCTURE: CPT

## 2024-03-29 PROCEDURE — 85025 COMPLETE CBC W/AUTO DIFF WBC: CPT

## 2024-03-29 PROCEDURE — 700111 HCHG RX REV CODE 636 W/ 250 OVERRIDE (IP): Performed by: HOSPITALIST

## 2024-03-29 PROCEDURE — 700111 HCHG RX REV CODE 636 W/ 250 OVERRIDE (IP): Performed by: ORTHOPAEDIC SURGERY

## 2024-03-29 PROCEDURE — 80048 BASIC METABOLIC PNL TOTAL CA: CPT

## 2024-03-29 PROCEDURE — 700105 HCHG RX REV CODE 258: Performed by: HOSPITALIST

## 2024-03-29 PROCEDURE — 82550 ASSAY OF CK (CPK): CPT

## 2024-03-29 PROCEDURE — 700105 HCHG RX REV CODE 258: Performed by: ORTHOPAEDIC SURGERY

## 2024-03-29 PROCEDURE — 99233 SBSQ HOSP IP/OBS HIGH 50: CPT | Performed by: HOSPITALIST

## 2024-03-29 PROCEDURE — 700102 HCHG RX REV CODE 250 W/ 637 OVERRIDE(OP): Performed by: NURSE PRACTITIONER

## 2024-03-29 PROCEDURE — 97162 PT EVAL MOD COMPLEX 30 MIN: CPT

## 2024-03-29 PROCEDURE — 97166 OT EVAL MOD COMPLEX 45 MIN: CPT

## 2024-03-29 PROCEDURE — A9270 NON-COVERED ITEM OR SERVICE: HCPCS | Performed by: ORTHOPAEDIC SURGERY

## 2024-03-29 PROCEDURE — 74018 RADEX ABDOMEN 1 VIEW: CPT

## 2024-03-29 RX ORDER — SODIUM CHLORIDE 9 MG/ML
INJECTION, SOLUTION INTRAVENOUS CONTINUOUS
Status: DISCONTINUED | OUTPATIENT
Start: 2024-03-29 | End: 2024-03-31

## 2024-03-29 RX ORDER — ENOXAPARIN SODIUM 100 MG/ML
30 INJECTION SUBCUTANEOUS DAILY
Status: DISCONTINUED | OUTPATIENT
Start: 2024-03-29 | End: 2024-04-04 | Stop reason: HOSPADM

## 2024-03-29 RX ORDER — CALCIUM CARBONATE 500 MG/1
500 TABLET, CHEWABLE ORAL 3 TIMES DAILY PRN
Status: DISCONTINUED | OUTPATIENT
Start: 2024-03-29 | End: 2024-04-04 | Stop reason: HOSPADM

## 2024-03-29 RX ORDER — PROMETHAZINE HYDROCHLORIDE 25 MG/1
25 SUPPOSITORY RECTAL EVERY 6 HOURS PRN
Status: DISCONTINUED | OUTPATIENT
Start: 2024-03-29 | End: 2024-04-04 | Stop reason: HOSPADM

## 2024-03-29 RX ORDER — CARVEDILOL 3.12 MG/1
3.12 TABLET ORAL 2 TIMES DAILY WITH MEALS
Status: DISCONTINUED | OUTPATIENT
Start: 2024-03-29 | End: 2024-04-04 | Stop reason: HOSPADM

## 2024-03-29 RX ADMIN — ACETAMINOPHEN 650 MG: 325 TABLET, FILM COATED ORAL at 23:55

## 2024-03-29 RX ADMIN — ENOXAPARIN SODIUM 30 MG: 100 INJECTION SUBCUTANEOUS at 17:58

## 2024-03-29 RX ADMIN — CEFAZOLIN 2 G: 2 INJECTION, POWDER, FOR SOLUTION INTRAMUSCULAR; INTRAVENOUS at 09:14

## 2024-03-29 RX ADMIN — CARVEDILOL 3.12 MG: 3.12 TABLET, FILM COATED ORAL at 17:56

## 2024-03-29 RX ADMIN — ACETAMINOPHEN 650 MG: 325 TABLET, FILM COATED ORAL at 00:32

## 2024-03-29 RX ADMIN — KETOROLAC TROMETHAMINE 15 MG: 15 INJECTION, SOLUTION INTRAMUSCULAR; INTRAVENOUS at 08:59

## 2024-03-29 RX ADMIN — SCOPOLAMINE 1 PATCH: 1.5 PATCH, EXTENDED RELEASE TRANSDERMAL at 11:04

## 2024-03-29 RX ADMIN — ATORVASTATIN CALCIUM 40 MG: 40 TABLET, FILM COATED ORAL at 20:57

## 2024-03-29 RX ADMIN — MONTELUKAST 10 MG: 10 TABLET, FILM COATED ORAL at 17:56

## 2024-03-29 RX ADMIN — ACETAMINOPHEN 650 MG: 325 TABLET, FILM COATED ORAL at 05:48

## 2024-03-29 RX ADMIN — DEXAMETHASONE SODIUM PHOSPHATE 4 MG: 4 INJECTION INTRA-ARTICULAR; INTRALESIONAL; INTRAMUSCULAR; INTRAVENOUS; SOFT TISSUE at 05:51

## 2024-03-29 RX ADMIN — ONDANSETRON 4 MG: 2 INJECTION INTRAMUSCULAR; INTRAVENOUS at 09:18

## 2024-03-29 RX ADMIN — DIPHENHYDRAMINE HYDROCHLORIDE 25 MG: 50 INJECTION, SOLUTION INTRAMUSCULAR; INTRAVENOUS at 10:26

## 2024-03-29 RX ADMIN — LEVOTHYROXINE SODIUM 175 MCG: 0.17 TABLET ORAL at 05:48

## 2024-03-29 RX ADMIN — SODIUM CHLORIDE: 9 INJECTION, SOLUTION INTRAVENOUS at 10:03

## 2024-03-29 RX ADMIN — ACETAMINOPHEN 650 MG: 325 TABLET, FILM COATED ORAL at 17:57

## 2024-03-29 RX ADMIN — CEFAZOLIN 2 G: 2 INJECTION, POWDER, FOR SOLUTION INTRAMUSCULAR; INTRAVENOUS at 00:35

## 2024-03-29 RX ADMIN — ONDANSETRON 4 MG: 2 INJECTION INTRAMUSCULAR; INTRAVENOUS at 13:35

## 2024-03-29 RX ADMIN — DOCUSATE SODIUM 50 MG AND SENNOSIDES 8.6 MG 1 TABLET: 8.6; 5 TABLET, FILM COATED ORAL at 20:57

## 2024-03-29 RX ADMIN — KETOROLAC TROMETHAMINE 15 MG: 15 INJECTION, SOLUTION INTRAMUSCULAR; INTRAVENOUS at 03:50

## 2024-03-29 RX ADMIN — ONDANSETRON 4 MG: 2 INJECTION INTRAMUSCULAR; INTRAVENOUS at 03:47

## 2024-03-29 RX ADMIN — DOCUSATE SODIUM 100 MG: 100 CAPSULE, LIQUID FILLED ORAL at 05:48

## 2024-03-29 RX ADMIN — DOCUSATE SODIUM 100 MG: 100 CAPSULE, LIQUID FILLED ORAL at 17:59

## 2024-03-29 RX ADMIN — LORATADINE 10 MG: 10 TABLET ORAL at 05:48

## 2024-03-29 RX ADMIN — ANTACID TABLETS 500 MG: 500 TABLET, CHEWABLE ORAL at 21:14

## 2024-03-29 RX ADMIN — KETOROLAC TROMETHAMINE 15 MG: 15 INJECTION, SOLUTION INTRAMUSCULAR; INTRAVENOUS at 09:12

## 2024-03-29 ASSESSMENT — ACTIVITIES OF DAILY LIVING (ADL): TOILETING: INDEPENDENT

## 2024-03-29 ASSESSMENT — ENCOUNTER SYMPTOMS
MYALGIAS: 0
NAUSEA: 1
PALPITATIONS: 0
DIAPHORESIS: 0
WEIGHT LOSS: 0
FOCAL WEAKNESS: 0
PSYCHIATRIC NEGATIVE: 1
SORE THROAT: 0
FEVER: 0
BRUISES/BLEEDS EASILY: 0
WEAKNESS: 0
RESPIRATORY NEGATIVE: 1
ABDOMINAL PAIN: 0
CARDIOVASCULAR NEGATIVE: 1
CONSTITUTIONAL NEGATIVE: 1
BLURRED VISION: 0
DIZZINESS: 0
CHILLS: 0
NEUROLOGICAL NEGATIVE: 1
HEADACHES: 0
EYES NEGATIVE: 1
DEPRESSION: 0
VOMITING: 1
FALLS: 1
SHORTNESS OF BREATH: 0
COUGH: 0

## 2024-03-29 ASSESSMENT — COGNITIVE AND FUNCTIONAL STATUS - GENERAL
DRESSING REGULAR UPPER BODY CLOTHING: A LITTLE
SUGGESTED CMS G CODE MODIFIER DAILY ACTIVITY: CK
SUGGESTED CMS G CODE MODIFIER MOBILITY: CL
MOBILITY SCORE: 12
STANDING UP FROM CHAIR USING ARMS: A LOT
HELP NEEDED FOR BATHING: A LOT
TOILETING: A LOT
MOVING TO AND FROM BED TO CHAIR: A LOT
DAILY ACTIVITIY SCORE: 17
CLIMB 3 TO 5 STEPS WITH RAILING: TOTAL
MOVING FROM LYING ON BACK TO SITTING ON SIDE OF FLAT BED: A LITTLE
DRESSING REGULAR LOWER BODY CLOTHING: A LOT
TURNING FROM BACK TO SIDE WHILE IN FLAT BAD: A LOT
WALKING IN HOSPITAL ROOM: A LOT

## 2024-03-29 ASSESSMENT — PAIN DESCRIPTION - PAIN TYPE
TYPE: ACUTE PAIN

## 2024-03-29 ASSESSMENT — LIFESTYLE VARIABLES: SUBSTANCE_ABUSE: 0

## 2024-03-29 ASSESSMENT — GAIT ASSESSMENTS: GAIT LEVEL OF ASSIST: UNABLE TO PARTICIPATE

## 2024-03-29 NOTE — CARE PLAN
The patient is Stable - Low risk of patient condition declining or worsening    Shift Goals  Clinical Goals: Maintain pain <5 throughout night, rest comfortably throughout night  Patient Goals: rest, pain relief  Family Goals: magdaleno    Progress made toward(s) clinical / shift goals: Pt medicated for pain per MAR, fall precautions in place.       Problem: Knowledge Deficit - Standard  Goal: Patient and family/care givers will demonstrate understanding of plan of care, disease process/condition, diagnostic tests and medications  3/29/2024 0055 by Julissa Royal R.N.  Outcome: Progressing  3/29/2024 0055 by Julissa Royal R.N.  Outcome: Progressing     Problem: Skin Integrity  Goal: Skin integrity is maintained or improved  3/29/2024 0055 by Julissa Royal R.N.  Outcome: Progressing  3/29/2024 0055 by OSCAR DorseyN.  Outcome: Progressing     Problem: Pain - Standard  Goal: Alleviation of pain or a reduction in pain to the patient’s comfort goal  3/29/2024 0055 by Julissa Royal R.N.  Outcome: Progressing  3/29/2024 0055 by Julissa Royal R.N.  Outcome: Progressing     Problem: Fall Risk  Goal: Patient will remain free from falls  3/29/2024 0055 by Julissa Royal R.KELSY.  Outcome: Progressing  3/29/2024 0055 by Julissa Royal R.N.  Outcome: Progressing

## 2024-03-29 NOTE — ASSESSMENT & PLAN NOTE
Contributing to dehydration and therefore acute renal failure  Addressing  Suspect pain meds contributed, now resolving  KUB showed no acute abnormalities  No associated pain  Following  Supportive care

## 2024-03-29 NOTE — OR NURSING
PACU note- Patient arrived from the OR, breathing easily, alert and oriented. Surgical site assessed, immobilizer to L leg in place. Patient is able to move all extremities, strong radial and pedal pulses, good capillary refill, denies numbness and tingling. Medicated for pain with good effect. Tolerating PO fluids without nausea. Daughter, Tiff called and updated.

## 2024-03-29 NOTE — OP REPORT
DATE OF OPERATION: 3/28/2024     PREOPERATIVE DIAGNOSIS: Left distal femur fracture    POSTOPERATIVE DIAGNOSIS: Same    PROCEDURE PERFORMED: Open treatment of left femoral supracondylar fracture without intracondylar extension    SURGEON: Johan Suero M.D.     ASSISTANT: Timothy Martin    ANESTHESIOLOGIST: Margaret    ANESTHESIA: General    ESTIMATED BLOOD LOSS: 25 mL    INDICATIONS: The patient is a 88 y.o. female with a left distal femur fracture resulting from a fall.  The patient denies antecedent pain, and was found to have a normal neurovascular exam and skin envelope.  Radiographs reviewed by myself demonstrated the distal femur fracture.  Given these findings, surgical treatment of the distal fracture was indicated.  I discussed the risks and benefits of the procedure, including the risks of infection, wound healing complication, neurovascular injury, pain, malunion, non-union, malrotation, and the medical risks of anesthesia including DVT, PE, MI, stroke, and death.  Benefits include early mobilization, improved chance of union, and reduction in the medical risks of femur fractures.  Alternatives to surgery were also discussed, including non-operative management.  The patient signed the informed consent and the operative extremity was marked.      PROCEDURE:  The patient underwent anesthesia, and was positioned supine on a radiolucent table and all bony prominences were well padded.  Preoperative antibiotics were administered. Sequential compression devices were employed. The correct operative site was prepped and draped into a sterile field. A procedural pause was conducted to verify correct patient, correct extremity, presence of the surgeons initials on the operative extremity.    A lateral incision was then made over the distal femur with care taken to avoid all neurovascular structures. Soft tissue stripping was avoided to preserve blood flow to bone and maximize healing potential. The fracture was  reduced using a colinear reduction clamp and a Tye distal femur locking plate was applied with a combination of locking and non-locking screws. Anatomic reduction was obtained. All screws were checked and found to be of appropriate length and out of the joint. Wounds were irrigated with normal saline, and closed in layers, with 1-0 Vicyrl for fascia, 2-0 Vicryl in the subcutaneous tissue, and staples in the skin.  Sterile dressings were applied. A knee immobilizer was applied.    The patient tolerated the procedure well. There were no apparent complications. All sponge, needle, and instrument counts were correct on two separate occasions. She was awakened, extubated, and transferred to the recovery room in satisfactory condition.     The use of Timothy Martin as a surgical assistant was necessary for assistance with exposure, retraction, fracture reduction, instrumentation, and closure.    Post-Operative Plan:    1.  The patient should remain toe touch weightbearing on their operative extremity.  Gait aids (crutch or crutches, cane, walker) may be used as needed, and may be discontinued when no longer required.  2.  IV antibiotics - may be continued for 24 hours, but are not required.  3.  DVT prophylaxis - SCD's and Lovenox 40 mg SQ daily while inpatient.  The patient may transition to Aspirin 325 mg PO BID as an outpatient  4.  Discharge planning  ____________________________________   Johan Suero M.D.   DD: 3/28/2024  5:56 PM

## 2024-03-29 NOTE — PROGRESS NOTES
[facility] Medicine Daily Progress Note    Date of Service  3/29/2024    Chief Complaint  Jaqui Sifuentes is a 88 y.o. female admitted 3/27/2024 with leg pain after a mechanical fall    Hospital Course  Jaqui Sifuentes is a 88 y.o. female who presented to [facility] after a ground level fall on 3/27/2024. She has a past medical history of COPD, chronic hypoxic respiratory failure on 2 L oxygen, A-fib, hypothyroidism,and  mild aortic stenosis. She was walking down stairs when she had a misstep and collapsed.  She did hit her head but denied losing consciousness.  She denied lightheadedness or dizziness.  She deniesd any nausea, vomiting, diarrhea, shortness of breath, back pain, fevers.  Her only other complaint was left hand pain.  The patient does take Pradaxa and last took it 3/27 morning.     X-ray of the femur found communicated displaced distal femoral metaphyseal fracture  CT scan of the head was negative  EKG found to be controlled A-fib    Interval Problem Update  Axox3, daughter at beside again. Patient reports minimal leg pain but has nausea this am, had small episode of non bloody emesis this am, denies abdominal pain, She is urinating. Denies chest pain, no sob, stable on 2L of oxygen, bp on low side this am but no associated dizziness, starting iv fluids, following closely. Discussed with nursing.    I have discussed this patient's plan of care and discharge plan at IDT rounds today with Case Management, Nursing, Nursing leadership, and other members of the IDT team.    Consultants/Specialty  [place]    Code Status  DNAR/DNI    Disposition  The patient is not medically cleared for discharge to home or a post-acute facility.      I have placed the appropriate orders for post-discharge needs.    Review of Systems  Review of Systems   Constitutional: Negative.  Negative for chills, diaphoresis, fever, malaise/fatigue and weight loss.   HENT: Negative.  Negative for sore throat.    Eyes: Negative.   Negative for blurred vision.   Respiratory: Negative.  Negative for cough and shortness of breath.    Cardiovascular: Negative.  Negative for chest pain, palpitations and leg swelling.   Gastrointestinal:  Positive for nausea and vomiting. Negative for abdominal pain.   Genitourinary: Negative.  Negative for dysuria.   Musculoskeletal:  Positive for falls and joint pain. Negative for myalgias.   Skin: Negative.  Negative for itching and rash.   Neurological: Negative.  Negative for dizziness, focal weakness, weakness and headaches.   Endo/Heme/Allergies: Negative.  Does not bruise/bleed easily.   Psychiatric/Behavioral: Negative.  Negative for depression, substance abuse and suicidal ideas.    All other systems reviewed and are negative.       Physical Exam  Temp:  [35.7 °C (96.3 °F)-37.8 °C (100.1 °F)] 36 °C (96.8 °F)  Pulse:  [] 75  Resp:  [14-22] 18  BP: ()/(44-66) 95/48  SpO2:  [96 %-100 %] 98 %    Physical Exam  Vitals and nursing note reviewed. Exam conducted with a chaperone present.   Constitutional:       General: She is not in acute distress.     Appearance: Normal appearance. She is not diaphoretic.   HENT:      Head: Normocephalic.      Nose: Nose normal.      Mouth/Throat:      Mouth: Mucous membranes are moist.   Eyes:      Pupils: Pupils are equal, round, and reactive to light.   Cardiovascular:      Rate and Rhythm: Normal rate and regular rhythm.      Pulses: Normal pulses.      Heart sounds: Normal heart sounds.   Pulmonary:      Effort: Pulmonary effort is normal.      Breath sounds: Normal breath sounds.   Abdominal:      General: Abdomen is flat. Bowel sounds are normal.      Palpations: Abdomen is soft.   Musculoskeletal:         General: No swelling or deformity. Normal range of motion.      Comments: L leg surgical bandages in place, cdi   Skin:     General: Skin is warm and dry.      Capillary Refill: Capillary refill takes less than 2 seconds.   Neurological:      General: No  focal deficit present.      Mental Status: She is alert and oriented to person, place, and time.      Cranial Nerves: No cranial nerve deficit.   Psychiatric:         Mood and Affect: Mood normal.         Behavior: Behavior normal.         Fluids    Intake/Output Summary (Last 24 hours) at 3/29/2024 0953  Last data filed at 3/28/2024 2000  Gross per 24 hour   Intake 1750 ml   Output 750 ml   Net 1000 ml       Laboratory  Recent Labs     03/27/24 2110 03/28/24 0311 03/29/24  0237   WBC 9.5 10.9* 12.0*   RBC 3.17* 3.09* 2.97*   HEMOGLOBIN 10.1* 9.7* 9.1*   HEMATOCRIT 30.4* 29.6* 27.3*   MCV 95.9 95.8 91.9   MCH 31.9 31.4 30.6   MCHC 33.2 32.8 33.3   RDW 43.2 43.6 49.1   PLATELETCT 165 173 132*   MPV 10.5 11.4 11.0     Recent Labs     03/27/24 2110 03/28/24 0311 03/29/24 0237   SODIUM 132* 134* 129*   POTASSIUM 4.9 4.8 5.3   CHLORIDE 97 96 95*   CO2 25 23 22   GLUCOSE 114* 125* 171*   BUN 23* 24* 37*   CREATININE 1.31 1.37 2.15*   CALCIUM 9.3 9.6 8.8     Recent Labs     03/27/24 2110   APTT 54.9*   INR 1.99*               Imaging  DX-PORTABLE FLUOROSCOPY < 1 HOUR   Preliminary Result      Portable fluoroscopy utilized for 1 minute 20 seconds.         INTERPRETING LOCATION: [address] , [place], [address]      DX-FEMUR-2+ LEFT   Preliminary Result      Digitized intraoperative radiograph is submitted for review. This examination is not for diagnostic purpose but for guidance during a surgical procedure. Please see the patient's chart for full procedural details.         INTERPRETING LOCATION: [address], [place], [address]      DX-KNEE 2- LEFT   Final Result         1.  Comminuted displaced distal femoral metaphyseal fracture      CT-HEAD W/O   Final Result         1.  No acute intracranial abnormality is identified, there are nonspecific white matter changes, commonly associated with small vessel ischemic disease.  Associated mild cerebral atrophy is noted.   2.  Atherosclerosis.              Assessment/Plan  * Closed  fracture of distal end of femur (HCC)- (present on admission)  Assessment & Plan  Pain control with oral and IV narcotics  S/p surgical repair with Dr Suero 3/28/24  DVT prophylaxis after surgery  PT OT  Pain control and supportive care    Hyponatremia  Assessment & Plan  Likely due to dehydration  Following  Iv fluids  Bmp in am    Nausea and vomiting  Assessment & Plan  Contributing to dehydration and therefore acute renal failure  Addressing  Suspect pain meds contributed  Following  Supportive care    Acute renal failure (ARF) ([facility])  Assessment & Plan  Clinically due to dehydration  Starting iv fluids  Following closely   Hold diuretics for now  Avoid nephrotoxins  No evidence of retention  Renal dose meds  Repeat bmp in am  Following, if no improvement will check renal us  Will also check cpk    COPD (chronic obstructive pulmonary disease) (Former[facility])  Assessment & Plan  Not in exacerbation  Continue home inhalers    Encounter for preoperative assessment  Assessment & Plan  Admit to:    Orthopedic/Med-Surg floor since no major co-morbidities.     Cardiovascular:   Patient has history of CHF: Continue home beta blocker and rate control medications.   Pre-op EKG: Yes    Pulmonary:  Oxygen per protocol  Incentive Spirometer    GI:   No history of cirrhosis. Standard bowel regimen. Hold for loose stools.    Renal:   IV fluids: No fluids - risk of fluid overload    Labs: Metabolic Panel every 6 hours for 24 hours for significant electrolyte derangements    Musculoskeletal:   Check 25 OH vitamin D level. If 31-40 pg/mL, consider starting vitamin D3 1000 IU PO daily. If 20-30 pg/mL, consider vitamin D3 2000 IU PO daily. If <20 pg/mL, vitamin D2 50,000 IU weekly x 8 weeks then 2000 IU PO daily.    Neurologic:   Pain Control: Neuro checks every 4 hours  Avoid fentanyl (short-acting)    Hematologic:  Plan on pharmacologic DVT prophylaxis post operative day #1. Hold for decreasing hemoglobin. Notify provider for hemoglobin  less than 8.  Order preoperative type and cross.   Hgb every 6 hours if high bleeding risk.   If patient was on anticoagulation prior to arrival risks and benefits will be weighed by teams including surgery, hospitalist, geriatrics, and anesthesia for delaying surgery more than 24 hours.   On anticoagulation prior to arrival: Yes: Significant preoperative anemia or INR greater than 1.5, draw hgb/hct every 4 hours for 24 hours, INR daily for 2 days.     Medical Assessment Risk:  High    Surgical Risk:   Intermediate      Hypertension  Assessment & Plan  Decreasing home bp meds due to mild hypotension, see above, following closely and adjusting as needed  Holding diuretics    Aortic stenosis  Assessment & Plan  Mild that was found on echo in 2022    Chronic hypoxic respiratory failure (HCC)  Assessment & Plan  She remains on 2 L of O2 which is her baseline  No evidence of acute exacerbation    Atrial fibrillation (HCC)  Assessment & Plan  Rate controlled  Continue Coreg but decrease dose due to mild hypotension, following closely and adjusting dose as needed  Hold Pradaxa- currently due to acute renal failure, she is on ppd of renal dosed lovenox  following    Fall  Assessment & Plan  Patient denies any other symptoms including back pain headache  Reports it was purely mechanical  PT and OT           VTE prophylaxis: pradaxa held for surgery, renal dose lovenox for now    I have performed a physical exam and reviewed and updated ROS and Plan today (3/29/2024). In review of yesterday's note (3/28/2024), there are no changes except as documented above.

## 2024-03-29 NOTE — THERAPY
Physical Therapy   Initial Evaluation     Patient Name: Jaqui Sifuentes  Age:  88 y.o., Sex:  female  Medical Record #: [id number]  Today's Date: 3/29/2024     Precautions  Precautions: Fall Risk;Toe Touch Weight Bearing Left Lower Extremity;Immobilizer Left Lower Extremity  Comments: immobilizer AAT    Assessment  Patient is 88 y.o. female admitted after fall with periprosthetic L distal femur fracture, now POD #1 ORIF.  PMH includes COPD, chronic hypoxic respiratory failure on 2L, A fib, hypothyroidism, and mild aortic stenosis.  Pt presented for PT evaluation with increased nausea, decreased activity tolerance, pain, and generalized weakness.  Pt normally lives in [place], is independent with mobility using 4WW as needed for walks outside of her home.  Pt mobilized as detailed below with min A for supine <> sit, unable to attempt STS due to onset of nausea & dizziness.  Therapeutic activity provided in conjunction with PT evaluation including: education on role of acute vs post acute PT, strategies for TTWB, use of immobilizer, and daily mobility including sitting EOB for meals.  Pt is below her functional baseline, would benefit from continued skilled therapy.     Plan    Physical Therapy Initial Treatment Plan   Treatment Plan : Bed Mobility, Equipment, Gait Training, Neuro Re-Education / Balance, Self Care / Home Evaluation, Stair Training, Therapeutic Activities, Therapeutic Exercise  Treatment Frequency: 5 Times per Week  Duration: Until Therapy Goals Met    DC Equipment Recommendations: Unable to determine at this time  Discharge Recommendations: Recommend post-acute placement for additional physical therapy services prior to discharge home     Objective     03/29/24 1113   Precautions   Precautions Fall Risk;Toe Touch Weight Bearing Left Lower Extremity;Immobilizer Left Lower Extremity   Comments immobilizer AAT   Pain 0 - 10 Group   Therapist Pain Assessment Post Activity Pain Same as Prior to  Activity;Nurse Notified (Nauseous)   Prior Living Situation   Prior Services Home-Independent   Housing / Facility 1 Story House   Steps Into Home 2 (platform steps)   Equipment Owned Front-Wheel Walker;4-Wheel Walker;Single Point Cane   Lives with - Patient's Self Care Capacity Alone and Able to Care For Self   Comments Pt & her daughter were in town visiting, normally live in [place].   Prior Level of Functional Mobility   Bed Mobility Independent   Transfer Status Independent   Ambulation Independent   Ambulation Distance community distances   Assistive Devices Used (4WW as needed for outside of home)   Stairs Independent   History of Falls   History of Falls Yes   Cognition    Cognition / Consciousness WDL   Comments Very pleasant & motivated, limited by nausea   Active ROM Lower Body    Comments RLE WFL, L ankle WFL otherwise NT due to immobilizer   Strength Lower Body   Comments RLE WFL, L ankle DF 5/5   Sensation Lower Body   Comments Endorsed mild numbness LLE   Other Treatments   Other Treatments Provided Therapeutic activity provided in conjunction with PT evaluation including: education on role of acute vs post acute PT, strategies for TTWB, use of immobilizer, and daily mobility including sitting EOB for meals.   Balance Assessment   Sitting Balance (Static) Fair   Sitting Balance (Dynamic) Fair   Weight Shift Sitting Fair   Bed Mobility    Supine to Sit Minimal Assist   Sit to Supine Moderate Assist (due to nausea & dizziness)   Gait Analysis   Gait Level Of Assist Unable to Participate   Functional Mobility   Sit to Stand Unable to Participate   Mobility EOB   Comments Planned to attempt STS however pt became quite nauseous & dizzy, deferred.   Activity Tolerance   Sitting Edge of Bed 15+ min   Short Term Goals    Short Term Goal # 1 Pt will perform supine <> sit with SPV in 6 visits to progress bed mobility   Short Term Goal # 2 Pt will perform STS with FWW and min A in 6 visits to progress OOB mobility    Short Term Goal # 3 Pt will perform stand pivot transfers with FWW and min A in 6 visits to progress functional OOB mobility   Short Term Goal # 4 Pt will ambulate 50 ft with FWW and mod A in 6 visits to progress functional gait

## 2024-03-29 NOTE — ANESTHESIA TIME REPORT
Anesthesia Start and Stop Event Times       Date Time Event    3/28/2024 1639 Ready for Procedure     1643 Anesthesia Start     1816 Anesthesia Stop          Responsible Staff  03/28/24      Name Role Begin End    Clif Brown M.D. Anesth 1643 1701    Boris Robles M.D. Anesth 1701 1816          Overtime Reason:  no overtime (within assigned shift)    Comments:

## 2024-03-29 NOTE — DISCHARGE PLANNING
Not medically cleared for discharge.   Pending PT/OT evaluation today. Surgery completed yesterday.   Physiatry consult placed by attending provider as patient will be a good candidate for [facility].   Daughter, Tiff, at bedside is agreeable for patient to rehab in [place] if necessary. Will be able to provide support upon discharge.     Case Management Discharge Planning    Admission Date: 3/27/2024  GMLOS: 4.5  ALOS: 2    6-Clicks ADL Score: 15  6-Clicks Mobility Score: 8  PT and/or OT Eval ordered: Yes  Post-acute Referrals Ordered: Yes  Post-acute Choice Obtained: No  Has referral(s) been sent to post-acute provider:  Yes      Anticipated Discharge Dispo: Discharge Disposition: Disch to  rehab facility or distinct part unit ()    DME Needed: Yes    DME Ordered: No    Action(s) Taken: Updated Provider/Nurse on Discharge Plan, Patient Conference, and Family Conference    Escalations Completed: Provider and Speciality Provider    Medically Clear: No    Next Steps: Pending physiatry consult.     Barriers to Discharge: Medical clearance, Pending Placement, and Pending PT Evaluation    Is the patient up for discharge tomorrow: No

## 2024-03-29 NOTE — PROGRESS NOTES
Orthopedics progress note    Left periprosthetic distal femur fracture  Status post 3/28/2024: ORIF left distal femur periprosthetic fracture    Seen in room 601.  Patient states pain in left leg is controlled however she is having some nausea.  Discussed surgery and postop plan    Dressings intact.  Knee brace intact.  Motor and sensory exam intact    Plan:  Touchdown weightbearing left lower extremity, knee immobilizer in place for comfort okay to remove while in bed  Dressing changes as needed  Recommend Lovenox while inpatient and then transition to aspirin as outpatient  PT/OT  Dispo planning  Follow-up QUANG 2 weeks postop      Timothy Martin MD  [place]

## 2024-03-29 NOTE — ASSESSMENT & PLAN NOTE
Clinically due to dehydration  Emesis was contributing - now resolved  Continues to improve, will stop iv fluids, continue to monitor fluid status closely  Will continue to follow closely  Continue to hold diuretics, may resume tomorrow if indicated  Bmp in am  Avoid nephrotoxins  No evidence of retention  Renal dose meds  Cpk minimally elevated

## 2024-03-29 NOTE — PROGRESS NOTES
Assumed care of pt, c/o minor pain to LLE, dressings and immobilizer brace in place. All medications taken and tolerated, medicated for pain per MAR. Pt resting in bed w/ call light and belongings in reach, bed alarm on, hourly rounding in place. No additional needs at this time.

## 2024-03-29 NOTE — CARE PLAN
The patient is Stable - Low risk of patient condition declining or worsening    Shift Goals  Clinical Goals: Pt will experience relief from nausea during shift  Patient Goals: rest, nausea and pain control  Family Goals: rest and pain management    Progress made toward(s) clinical / shift goals:    Problem: Knowledge Deficit - Standard  Goal: Patient and family/care givers will demonstrate understanding of plan of care, disease process/condition, diagnostic tests and medications  Outcome: Progressing     Problem: Skin Integrity  Goal: Skin integrity is maintained or improved  Outcome: Progressing     Problem: Pain - Standard  Goal: Alleviation of pain or a reduction in pain to the patient’s comfort goal  Outcome: Progressing  Note: Pt has had minimal pain throughout the shift. Scheduled pain meds allowing the patient to rest comfortably.      Problem: Fall Risk  Goal: Patient will remain free from falls  Outcome: Progressing  Note: Pt understands the needs to call for assistance with toileting and for other needs. Bed locked and in the lowest position. Pt able to work with PT to sit on the edge of the bed safely, but unable to stand or ambulate due to nausea.        Patient is not progressing towards the following goals:

## 2024-03-29 NOTE — ANESTHESIA POSTPROCEDURE EVALUATION
Patient: Jaqui Sifuentes    Procedure Summary       Date: 03/28/24 Room / Location: [place] [address] / SURGERY [facility]    Anesthesia Start: 1643 Anesthesia Stop: 1816    Procedure: ORIF, FRACTURE, FEMUR-DISTAL (Left: Knee) Diagnosis: (left periprosthetic distal femur fx)    Surgeons: Johan Suero M.D. Responsible Provider: Boris Robles M.D.    Anesthesia Type: general ASA Status: 3            Final Anesthesia Type: general  Last vitals  BP   Blood Pressure : 137/63    Temp   (!) 35.7 °C (96.3 °F)    Pulse   86   Resp   20    SpO2   99 %      Anesthesia Post Evaluation    Patient location during evaluation: PACU  Patient participation: complete - patient participated  Level of consciousness: awake and alert  Pain score: 1    Airway patency: patent  Anesthetic complications: no  Cardiovascular status: hemodynamically stable  Respiratory status: acceptable  Hydration status: euvolemic    PONV: none          No notable events documented.     Nurse Pain Score: 5 (NPRS)

## 2024-03-30 PROBLEM — D72.829 LEUKOCYTOSIS: Status: ACTIVE | Noted: 2024-03-30

## 2024-03-30 PROBLEM — D69.6 THROMBOCYTOPENIA (HCC): Status: ACTIVE | Noted: 2024-03-30

## 2024-03-30 LAB
ANION GAP SERPL CALC-SCNC: 13 MMOL/L (ref 7–16)
BASOPHILS # BLD AUTO: 0.1 % (ref 0–1.8)
BASOPHILS # BLD: 0.01 K/UL (ref 0–0.12)
BUN SERPL-MCNC: 50 MG/DL (ref 8–22)
CALCIUM SERPL-MCNC: 9.6 MG/DL (ref 8.5–10.5)
CHLORIDE SERPL-SCNC: 99 MMOL/L (ref 96–112)
CK SERPL-CCNC: 234 U/L (ref 0–154)
CO2 SERPL-SCNC: 24 MMOL/L (ref 20–33)
CREAT SERPL-MCNC: 1.88 MG/DL (ref 0.5–1.4)
EOSINOPHIL # BLD AUTO: 0 K/UL (ref 0–0.51)
EOSINOPHIL NFR BLD: 0 % (ref 0–6.9)
ERYTHROCYTE [DISTWIDTH] IN BLOOD BY AUTOMATED COUNT: 48.8 FL (ref 35.9–50)
FLUAV RNA SPEC QL NAA+PROBE: NEGATIVE
FLUBV RNA SPEC QL NAA+PROBE: NEGATIVE
GFR SERPLBLD CREATININE-BSD FMLA CKD-EPI: 26 ML/MIN/1.73 M 2
GLUCOSE SERPL-MCNC: 133 MG/DL (ref 65–99)
HCT VFR BLD AUTO: 26.5 % (ref 37–47)
HGB BLD-MCNC: 8.9 G/DL (ref 12–16)
IMM GRANULOCYTES # BLD AUTO: 0.12 K/UL (ref 0–0.11)
IMM GRANULOCYTES NFR BLD AUTO: 0.6 % (ref 0–0.9)
LYMPHOCYTES # BLD AUTO: 1.16 K/UL (ref 1–4.8)
LYMPHOCYTES NFR BLD: 6.1 % (ref 22–41)
MCH RBC QN AUTO: 31.6 PG (ref 27–33)
MCHC RBC AUTO-ENTMCNC: 33.6 G/DL (ref 32.2–35.5)
MCV RBC AUTO: 94 FL (ref 81.4–97.8)
MONOCYTES # BLD AUTO: 1.87 K/UL (ref 0–0.85)
MONOCYTES NFR BLD AUTO: 9.8 % (ref 0–13.4)
NEUTROPHILS # BLD AUTO: 15.86 K/UL (ref 1.82–7.42)
NEUTROPHILS NFR BLD: 83.4 % (ref 44–72)
NRBC # BLD AUTO: 0 K/UL
NRBC BLD-RTO: 0 /100 WBC (ref 0–0.2)
PLATELET # BLD AUTO: 163 K/UL (ref 164–446)
PMV BLD AUTO: 11 FL (ref 9–12.9)
POTASSIUM SERPL-SCNC: 4.8 MMOL/L (ref 3.6–5.5)
RBC # BLD AUTO: 2.82 M/UL (ref 4.2–5.4)
RSV RNA SPEC QL NAA+PROBE: NEGATIVE
SARS-COV-2 RNA RESP QL NAA+PROBE: NOTDETECTED
SODIUM SERPL-SCNC: 136 MMOL/L (ref 135–145)
SPECIMEN SOURCE: NORMAL
WBC # BLD AUTO: 19 K/UL (ref 4.8–10.8)

## 2024-03-30 PROCEDURE — A9270 NON-COVERED ITEM OR SERVICE: HCPCS

## 2024-03-30 PROCEDURE — A9270 NON-COVERED ITEM OR SERVICE: HCPCS | Performed by: HOSPITALIST

## 2024-03-30 PROCEDURE — 700102 HCHG RX REV CODE 250 W/ 637 OVERRIDE(OP)

## 2024-03-30 PROCEDURE — 85025 COMPLETE CBC W/AUTO DIFF WBC: CPT

## 2024-03-30 PROCEDURE — 700111 HCHG RX REV CODE 636 W/ 250 OVERRIDE (IP): Performed by: HOSPITALIST

## 2024-03-30 PROCEDURE — 99232 SBSQ HOSP IP/OBS MODERATE 35: CPT | Performed by: HOSPITALIST

## 2024-03-30 PROCEDURE — 700102 HCHG RX REV CODE 250 W/ 637 OVERRIDE(OP): Performed by: HOSPITALIST

## 2024-03-30 PROCEDURE — 700102 HCHG RX REV CODE 250 W/ 637 OVERRIDE(OP): Performed by: ORTHOPAEDIC SURGERY

## 2024-03-30 PROCEDURE — 770006 HCHG ROOM/CARE - MED/SURG/GYN SEMI*

## 2024-03-30 PROCEDURE — 87040 BLOOD CULTURE FOR BACTERIA: CPT | Mod: 91

## 2024-03-30 PROCEDURE — 0241U HCHG SARS-COV-2 COVID-19 NFCT DS RESP RNA 4 TRGT MIC: CPT

## 2024-03-30 PROCEDURE — 700102 HCHG RX REV CODE 250 W/ 637 OVERRIDE(OP): Performed by: NURSE PRACTITIONER

## 2024-03-30 PROCEDURE — 700105 HCHG RX REV CODE 258: Performed by: HOSPITALIST

## 2024-03-30 PROCEDURE — 700111 HCHG RX REV CODE 636 W/ 250 OVERRIDE (IP): Mod: JZ | Performed by: ORTHOPAEDIC SURGERY

## 2024-03-30 PROCEDURE — 82550 ASSAY OF CK (CPK): CPT

## 2024-03-30 PROCEDURE — 80048 BASIC METABOLIC PNL TOTAL CA: CPT

## 2024-03-30 PROCEDURE — 36415 COLL VENOUS BLD VENIPUNCTURE: CPT

## 2024-03-30 PROCEDURE — A9270 NON-COVERED ITEM OR SERVICE: HCPCS | Performed by: ORTHOPAEDIC SURGERY

## 2024-03-30 PROCEDURE — 99223 1ST HOSP IP/OBS HIGH 75: CPT | Performed by: PHYSICAL MEDICINE & REHABILITATION

## 2024-03-30 PROCEDURE — A9270 NON-COVERED ITEM OR SERVICE: HCPCS | Performed by: NURSE PRACTITIONER

## 2024-03-30 RX ORDER — FAMOTIDINE 20 MG/1
10 TABLET, FILM COATED ORAL ONCE
Status: COMPLETED | OUTPATIENT
Start: 2024-03-30 | End: 2024-03-30

## 2024-03-30 RX ORDER — ACETAMINOPHEN 500 MG
500 TABLET ORAL EVERY 6 HOURS PRN
Status: DISCONTINUED | OUTPATIENT
Start: 2024-03-30 | End: 2024-04-04 | Stop reason: HOSPADM

## 2024-03-30 RX ADMIN — DOCUSATE SODIUM 50 MG AND SENNOSIDES 8.6 MG 1 TABLET: 8.6; 5 TABLET, FILM COATED ORAL at 20:34

## 2024-03-30 RX ADMIN — LISINOPRIL 5 MG: 5 TABLET ORAL at 04:26

## 2024-03-30 RX ADMIN — FAMOTIDINE 10 MG: 20 TABLET, FILM COATED ORAL at 05:12

## 2024-03-30 RX ADMIN — MONTELUKAST 10 MG: 10 TABLET, FILM COATED ORAL at 16:33

## 2024-03-30 RX ADMIN — SODIUM CHLORIDE: 9 INJECTION, SOLUTION INTRAVENOUS at 14:24

## 2024-03-30 RX ADMIN — LORATADINE 10 MG: 10 TABLET ORAL at 04:26

## 2024-03-30 RX ADMIN — CARVEDILOL 3.12 MG: 3.12 TABLET, FILM COATED ORAL at 08:46

## 2024-03-30 RX ADMIN — ACETAMINOPHEN 500 MG: 500 TABLET, FILM COATED ORAL at 23:00

## 2024-03-30 RX ADMIN — CARVEDILOL 3.12 MG: 3.12 TABLET, FILM COATED ORAL at 16:33

## 2024-03-30 RX ADMIN — ACETAMINOPHEN 650 MG: 325 TABLET, FILM COATED ORAL at 04:26

## 2024-03-30 RX ADMIN — DOCUSATE SODIUM 100 MG: 100 CAPSULE, LIQUID FILLED ORAL at 16:33

## 2024-03-30 RX ADMIN — ENOXAPARIN SODIUM 30 MG: 100 INJECTION SUBCUTANEOUS at 16:33

## 2024-03-30 RX ADMIN — DIPHENHYDRAMINE HYDROCHLORIDE 25 MG: 50 INJECTION, SOLUTION INTRAMUSCULAR; INTRAVENOUS at 04:47

## 2024-03-30 RX ADMIN — ATORVASTATIN CALCIUM 40 MG: 40 TABLET, FILM COATED ORAL at 20:33

## 2024-03-30 RX ADMIN — ANTACID TABLETS 500 MG: 500 TABLET, CHEWABLE ORAL at 02:09

## 2024-03-30 RX ADMIN — SODIUM CHLORIDE: 9 INJECTION, SOLUTION INTRAVENOUS at 01:16

## 2024-03-30 RX ADMIN — DOCUSATE SODIUM 100 MG: 100 CAPSULE, LIQUID FILLED ORAL at 04:26

## 2024-03-30 RX ADMIN — HALOPERIDOL LACTATE 1 MG: 5 INJECTION, SOLUTION INTRAMUSCULAR at 06:23

## 2024-03-30 RX ADMIN — LEVOTHYROXINE SODIUM 175 MCG: 0.17 TABLET ORAL at 04:25

## 2024-03-30 ASSESSMENT — ENCOUNTER SYMPTOMS
COUGH: 0
PSYCHIATRIC NEGATIVE: 1
SHORTNESS OF BREATH: 0
ABDOMINAL PAIN: 0
BLURRED VISION: 0
PALPITATIONS: 0
BRUISES/BLEEDS EASILY: 0
FEVER: 0
NAUSEA: 1
DIZZINESS: 0
WEAKNESS: 0
EYES NEGATIVE: 1
SORE THROAT: 0
CHILLS: 0
CARDIOVASCULAR NEGATIVE: 1
NEUROLOGICAL NEGATIVE: 1
DIAPHORESIS: 0
VOMITING: 1
FOCAL WEAKNESS: 0
DEPRESSION: 0
RESPIRATORY NEGATIVE: 1
FALLS: 1
WEIGHT LOSS: 0
MYALGIAS: 0
HEADACHES: 0
CONSTITUTIONAL NEGATIVE: 1

## 2024-03-30 ASSESSMENT — PAIN DESCRIPTION - PAIN TYPE
TYPE: ACUTE PAIN

## 2024-03-30 ASSESSMENT — FIBROSIS 4 INDEX: FIB4 SCORE: 2.4

## 2024-03-30 ASSESSMENT — LIFESTYLE VARIABLES: SUBSTANCE_ABUSE: 0

## 2024-03-30 NOTE — ASSESSMENT & PLAN NOTE
Clinically improved  Likely stress reaction from emesis and surgery  Improving  Will continue to follow  Will viral uri panel negative, could have viral gastroenteritis that is contributing  B/c so far negative, following  Cbc in am

## 2024-03-30 NOTE — DISCHARGE PLANNING
Case Management Discharge Planning    Admission Date: 3/27/2024  GMLOS: 4.5  ALOS: 3    6-Clicks ADL Score: 17  6-Clicks Mobility Score: 12  PT and/or OT Eval ordered: Yes  Post-acute Referrals Ordered: Yes  Post-acute Choice Obtained: Yes  Has referral(s) been sent to post-acute provider:  Yes      Anticipated Discharge Dispo: Discharge Disposition: Disch to  rehab facility or distinct part unit ()    DME Needed: No    Action(s) Taken: Referral(s) sent    Escalations Completed: None    Medically Clear: Yes    Next Steps: RNCM was notified that patient is medically cleared but will need SNF or rehab placement.[facility] is following and feel that she would be a good candidate and reached out to family. They were also inquiring about transporting patient to [place] for rehab there. Family verbalized understanding that would take additional time to arrange and it is in the best interest of the patient to look for local SNF and/or rehab placement first.     Barriers to Discharge: Pending Placement and Transportation    Is the patient up for discharge tomorrow: No

## 2024-03-30 NOTE — DISCHARGE PLANNING
[facility] Transitional Care Coordination    Referral from: [facility]   Insurance Provider on Facesheet:BCBS  Potential Rehab Diagnosis: GLF/Femur Fx  With head strike     Chart review indicates patient may have on going medical management and may have therapy needs to possibly meet inpatient rehab facility criteria with the goal of returning to community.    D/C support: daughter     Physiatry consultation forwarded per protocol.   [facility] following for post acute care         Thank you for the referral.

## 2024-03-30 NOTE — DISCHARGE PLANNING
PM&R consult complete.  Per Dr. Roman patient is currently functioning below their level of baseline, recommend post acute rehab  - recommend IRF level therapy with 3hr of therapy 5 days per week   - prior to acceptance to IRF, will need insurance auth BCBS, Cr improved, and WBC improved    - TCC to assist with insurance auth and DC support from daughters     1200 call out to shade Matthew [phone] no answer TC

## 2024-03-30 NOTE — DISCHARGE PLANNING
Received choice form @: No choice received  Agency/Facility name: [facility]  Sent referral per choice form @: 1715

## 2024-03-30 NOTE — PROGRESS NOTES
"    Orthopedic PA Progress Note    Interval changes:  Patient doing well postop  LLE dressings are CDI- changed today  - Monitor for lateral ankle pressure from KI  TTWB LLE with KI  No pending ortho procedures  DVT Prophylaxis outpatient: ASA 81 mg PO BID x4 weeks  Follow up with the [facility] trauma [facility] 2-3 weeks postop.  Cleared for DC from orthopedic standpoint pending therapy recs and medical optimization    ROS - Patient denies any new issues.  Denies any numbness or tingling. Pain well controlled.    BP (!) 130/98   Pulse 90   Temp 36.2 °C (97.2 °F) (Temporal)   Resp 18   Ht 1.651 m (5' 5\")   Wt 108 kg (237 lb 7 oz)   SpO2 99%     Patient seen and examined  No acute distress  Breathing non labored  RRR  LLE: Surgical dressing is clean, dry, and intact. Patient clearly fires tibialis anterior, EHL, and gastrocnemius/soleus. Sensation is intact to light touch throughout superficial peroneal, deep peroneal, tibial, saphenous, and sural nerve distributions. Strong and palpable 2+ dorsalis pedis and posterior tibial pulses with capillary refill less than 2 seconds.     Recent Labs     03/28/24  0311 03/29/24  0237 03/30/24  0521   WBC 10.9* 12.0* 19.0*   RBC 3.09* 2.97* 2.82*   HEMOGLOBIN 9.7* 9.1* 8.9*   HEMATOCRIT 29.6* 27.3* 26.5*   MCV 95.8 91.9 94.0   MCH 31.4 30.6 31.6   MCHC 32.8 33.3 33.6   RDW 43.6 49.1 48.8   PLATELETCT 173 132* 163*   MPV 11.4 11.0 11.0       Active Hospital Problems    Diagnosis     Leukocytosis [D72.829]     Thrombocytopenia (HCC) [D69.6]     Acute renal failure (ARF) (HCC) [N17.9]     Nausea and vomiting [R11.2]     Hyponatremia [E87.1]     Fall [W19.XXXA]     Atrial fibrillation (HCC) [I48.91]     Chronic hypoxic respiratory failure (HCC) [J96.11]     Aortic stenosis [I35.0]     Hypertension [I10]     Encounter for preoperative assessment [Z01.818]     COPD (chronic obstructive pulmonary disease) (HCC) [J44.9]     Closed fracture of distal end of femur ([facility]) [S79.032P]  "       Assessment/Plan:  Patient doing well postop  LLE dressings are CDI- changed today  - Monitor for lateral ankle pressure from   TTWB LLE with   No pending ortho procedures  DVT Prophylaxis outpatient: ASA 81 mg PO BID x4 weeks  Follow up with the [facility] trauma [facility] clinic 2-3 weeks postop.  Cleared for DC from orthopedic standpoint pending therapy recs and medical optimization    POD#3 S/p  Open treatment of left femoral supracondylar fracture without intracondylar extension   Wt bearing status - TTWB LLE in   Wound care/Drains - Dressings to be changed every other day by nursing. Or PRN for saturation starting POD#2  Future Procedures - None planned   Lovenox: Start 3/29*, Duration-until ambulatory > 150'  Sutures/Staples out- 14-21 days post operatively. Removal will completed by ortho LAURA's unless transferred.  DVT Prophylaxis outpatient: ASA 81 mg PO BID x4 weeks  PT/OT-initiated  Antibiotics:  Perioperative completed  DVT Prophylaxis- TEDS/SCDs/Foot pumps  [place]man-not needed per ortho  Case Coordination for Discharge Planning - Disposition per therapy recs.

## 2024-03-30 NOTE — THERAPY
Occupational Therapy   Initial Evaluation     Patient Name: Jaqui Sifuentes  Age:  88 y.o., Sex:  female  Medical Record #: [id number]  Today's Date: 3/29/2024     Precautions  Precautions: (P) Fall Risk, Toe Touch Weight Bearing Left Lower Extremity, Immobilizer Left Lower Extremity  Comments: immobilizer AAT    Assessment  Patient is an 88 y.o. female with a diagnosis of closed fracture of distal end of left femur 2/2 GLF when she missed the last step when going down stairs at a [employer]. Is now s/p ORIF with Dr. Suero on 3/28. Pt is TTWB with knee immobilizer. Pt and her daughter were traveling through from [place] when she fell. Additional factors influencing patient status / progress: PMHx includes COPD, chronic hypoxic respiratory failure on 2 L oxygen, A-fib, hypothyroidism,and mild aortic stenosis.     During OT eval, pt presented with decreased functional mobility, impaired balance, and nausea. Pt was able to don socks in long sit in bed with modA. Would benefit from ADL AE training in future session due to immobilizer. Pt demo'd progress compared to earlier this date with PT and tolerated standing from EOB with FWW and Popeye. Was able to pivot up HOB to reposition before returning to supine. Pt and her daughter are very receptive and eager to progress pt's function. Education provided on role of acute OT and extensive education on discharge planning including purpose and goals of inpatient rehab vs SNF. Pt's daughter reports she plans to assist pt as needed at discharge and take pt home to [place]. Encouraged pt to sit EOB with nursing. Recommend PM&R consult.      Plan    Occupational Therapy Initial Treatment Plan   Treatment Interventions: (P) Self Care / Activities of Daily Living, Adaptive Equipment, Neuro Re-Education / Balance, Therapeutic Exercises, Therapeutic Activity  Treatment Frequency: (P) 4 Times per Week  Duration: (P) Until Therapy Goals Met    DC Equipment Recommendations: (P) Unable to  "determine at this time  Discharge Recommendations: (P) Recommend post-acute placement for additional occupational therapy services prior to discharge home (PM&R consult)     Subjective    \"I want to get stronger and be able to go home.\"     Objective     03/29/24 1614   Initial Contact Note    Initial Contact Note Order Received and Verified, Occupational Therapy Evaluation in Progress with Full Report to Follow.   Prior Living Situation   Prior Services Home-Independent   Housing / Facility 1 Story House   Steps Into Home 3  (platform steps)   Bathroom Set up Walk In Shower;Grab Bars   Equipment Owned Front-Wheel Walker;4-Wheel Walker;Single Point Cane;Grab Bar(s) In Tub / Shower;Hand Held Shower   Lives with - Patient's Self Care Capacity Alone and Able to Care For Self   Comments Pt and her daughter were traveling through from [place]. Pt's daughter lives about an hour away in [place] but reports she plans to assist pt as needed at disharge until pt is able to live independently again   Prior Level of ADL Function   Self Feeding Independent   Grooming / Hygiene Independent   Bathing Independent   Dressing Independent   Toileting Independent   Prior Level of IADL Function   Medication Management Independent   Laundry Independent   Kitchen Mobility Independent   Finances Independent   Home Management Independent   Shopping Independent   Prior Level Of Mobility Independent Without Device in Community   Driving / Transportation Driving Independent   Occupation (Pre-Hospital Vocational) Retired Due To Age   Leisure Interests Travel   History of Falls   History of Falls Yes   Date of Last Fall   (reason for admit, missed last step at a [employer])   Precautions   Precautions Fall Risk;Toe Touch Weight Bearing Left Lower Extremity;Immobilizer Left Lower Extremity   Pain   Intervention Declines  (nausea was more limiting factor)   Non Verbal Descriptors   Non Verbal Scale  Calm   Cognition    Cognition / Consciousness WDL "   Comments Very pleasant and cooperative, motivated and receptive   Active ROM Upper Body   Active ROM Upper Body  WDL   Strength Upper Body   Upper Body Strength  WDL   Sensation Upper Body   Upper Extremity Sensation  Not Tested   Upper Body Muscle Tone   Upper Body Muscle Tone  WDL   Neurological Concerns   Neurological Concerns No   Coordination Upper Body   Coordination WDL   Balance Assessment   Sitting Balance (Static) Fair   Sitting Balance (Dynamic) Fair   Standing Balance (Static) Fair -   Standing Balance (Dynamic) Poor   Weight Shift Sitting Fair   Weight Shift Standing Absent  (TTWB LLE)   Bed Mobility    Supine to Sit Minimal Assist   Sit to Supine Moderate Assist  (BLEs, was close to lifting both legs onto bed via hook method)   Scooting Standby Assist   Comments HOB elevated for supine to sit, flat when returning to supine   ADL Assessment   Upper Body Dressing Supervision  (gown)   Lower Body Dressing Moderate Assist  (socks, donned in long sit in bed)   Toileting   (declined need, purewick)   How much help from another person does the patient currently need...   Putting on and taking off regular lower body clothing? 2   Bathing (including washing, rinsing, and drying)? 2   Toileting, which includes using a toilet, bedpan, or urinal? 2   Putting on and taking off regular upper body clothing? 3   Taking care of personal grooming such as brushing teeth? 4   Eating meals? 4   6 Clicks Daily Activity Score 17   Functional Mobility   Sit to Stand Minimal Assist   Mobility EOB and STS x1   Activity Tolerance   Sitting Edge of Bed 5+ min   Standing ~1 min   Patient / Family Goals   Patient / Family Goal #1 To have rehab before going home   Short Term Goals   Short Term Goal # 1 Pt will complete LBD with Popeye   Short Term Goal # 2 Pt will txfer to BSC with modA   Short Term Goal # 3 Pt will complete toileting with modA   Education Group   Education Provided Weight Bearing Precautions;Pathology of  bedrest;Other (comments)   Role of Occupational Therapist Patient Response Patient;Family;Acceptance;Explanation;Verbal Demonstration   Weight Bearing Precautions Patient Response Patient;Family;Acceptance;Explanation;Verbal Demonstration;Action Demonstration;Reinforcement Needed   Pathology of Bedrest Patient Response Patient;Family;Acceptance;Explanation;Verbal Demonstration   Additional Comments Extensive education on discharge planning and post-acute services including inpatient rehab vs SNF   Occupational Therapy Initial Treatment Plan    Treatment Interventions Self Care / Activities of Daily Living;Adaptive Equipment;Neuro Re-Education / Balance;Therapeutic Exercises;Therapeutic Activity   Treatment Frequency 4 Times per Week   Duration Until Therapy Goals Met   Problem List   Problem List Decreased Active Daily Living Skills;Decreased Homemaking Skills;Decreased Functional Mobility;Decreased Activity Tolerance;Impaired Postural Control / Balance   Anticipated Discharge Equipment and Recommendations   DC Equipment Recommendations Unable to determine at this time   Discharge Recommendations Recommend post-acute placement for additional occupational therapy services prior to discharge home  (PM&R consult)   Interdisciplinary Plan of Care Collaboration   IDT Collaboration with  Nursing;Family / Caregiver   Patient Position at End of Therapy In Bed;Call Light within Reach;Tray Table within Reach;Phone within Reach;Family / Friend in Room   Collaboration Comments RN updated; pt's daughter present and supportive, very involved in POC   Session Information   Date / Session Number  3/29 #1 (1/4, 4/4)

## 2024-03-30 NOTE — CONSULTS
Physical Medicine and Rehabilitation Consultation              Date of initial consultation: 3/30/2024  Requesting provider: ordered by Devora Brooks M.D. at 03/30/24 0931    Consulting provider: Natalia Roman D.O.  Reason for consultation: assess for acute inpatient rehab appropriateness  LOS: 3 Day(s)    Chief complaint: LLE pain after GLF     HPI: The patient is a 88 y.o.  female with a past medical history of COPD on 2 L at baseline, a fib, hypothyroidism, and mild aortic stenosis;  who presented on 3/27/2024  8:26 PM with LLE pain after a GLF. Per documentation, patient missed a step at [employer] and fell, landing on her left side. She did hit her head, but no LOC. Upon eval in the ED, CT head was negative for acute intracranial findings and L knee xray showed a comminuted displaced distal femoral metaphyseal fracture. Ortho was consulted, and patient was taken to the OR on 3/28 for ORIF of the distal femur periprosthetic fracture performed by  Dr. Moore. Patient is TTWB LLE, and requires LLE immobilizer AAT. Pos top, patient has leukocytosis, HIMANSHU, and ABLA. Patient's WBC is uptrending, 3/30 BWC 19.0. Patient has been stable on 1.5 L o2. Her hospital course has been notable for episode of N/V, which was attributed to pain meds. KUB negative for signs of ileus or obstruction.     Patient seen and examined at bedside with daughters in room. Patient reports she feels ok. Reports her nausea has resolved. Reports LLE pain controlled at rest, it flares up with movement. Otherwise doing well, Does not report HA, lightheadedness, SOB, CP, abdominal pain, or changes in numbness/tingling/weakness.       Social Hx:  Patient lives alone in [place], [place] a 1 story house with 3 EMILY, daughters are  planning to stay with patient to provide help , two daughters  can switch off providing care and can be available to assist with transport back to [place] via flight   3 EMILY  At prior level of function patient was  Independent with mobility and ADLs.     Tobacco: Denies   Alcohol: Occasional   Drugs: Denies     THERAPY:  Restrictions: Fall Risk, TTWB LLE, LLE immobilizer ATT   PT: Functional mobility   3/29 unable to tolerate sit to stand due to nausea, unable to tolerate gait  Min A bed mobiliy    OT: ADLs  3/29 Mod A lower body dressing, Min A bed mobility, Min A sit to stand     SLP:   None     IMAGING:  DX-FEMUR-2+ LEFT  Narrative: 3/28/2024 4:43 PM    HISTORY/REASON FOR EXAM:  Left femur ORIF    TECHNIQUE/EXAM DESCRIPTION AND NUMBER OF VIEWS:  4 views of the LEFT femur.  Digitized Intraoperative Radiograph    FINDINGS:    Fluoroscopic time: 1 minute 20 seconds    Fluoroscopy dose(DAP): 2.3112 Gy*cm^2  Impression: Digitized intraoperative radiograph is submitted for review. This examination is not for diagnostic purpose but for guidance during a surgical procedure. Please see the patient's chart for full procedural details.    INTERPRETING LOCATION: [address] [place] [address], [id number]  DX-PORTABLE FLUOROSCOPY < 1 HOUR  Narrative: 3/28/2024 4:43 PM    HISTORY/REASON FOR EXAM:  Left femur ORIF    TECHNIQUE/EXAM DESCRIPTION AND NUMBER OF VIEWS:  Portable fluoroscopy for less than one hour in OR.    FINDINGS:  The portable fluoroscopy unit was obligated to the procedure for less than one hour. Actual fluoro time was 1 minute 20 seconds.    Fluoroscopy dose(DAP): 2.3112 Gy*cm^2  Impression: Portable fluoroscopy utilized for 1 minute 20 seconds.    INTERPRETING LOCATION: [address] [place] [address], [id number]  [id number] VIEW  Narrative: 3/29/2024 1:38 PM    HISTORY/REASON FOR EXAM:  Nausea.    TECHNIQUE/EXAM DESCRIPTION AND NUMBER OF VIEWS:  1 view(s) of the abdomen.    COMPARISON: None    FINDINGS:  Nonobstructive bowel gas pattern. No abnormal calcifications. Degenerative changes in the lumbar spine.  Impression: Nonobstructive bowel gas pattern.        PROCEDURES:  3/28 Open treatment of left femoral supracondylar fracture without  intracondylar extension by Dr. Suero     PMH:  Past Medical History:   Diagnosis Date    COPD (chronic obstructive pulmonary disease) (HCC)     Hypertension     Hypothyroid        PSH:  Past Surgical History:   Procedure Laterality Date    ORIF, FRACTURE, FEMUR Left 3/28/2024    Procedure: ORIF, FRACTURE, FEMUR-DISTAL;  Surgeon: Johan Suero M.D.;  Location: SURGERY [facility];  Service: Orthopedics    KNEE REPLACEMENT, TOTAL Left     LUMPECTOMY Right        FHX:  History reviewed. No pertinent family history.    Medications:  Current Facility-Administered Medications   Medication Dose    NS infusion      enoxaparin (Lovenox) inj 30 mg  30 mg    carvedilol (Coreg) tablet 3.125 mg  3.125 mg    promethazine (Phenergan) suppository 25 mg  25 mg    calcium carbonate (Tums) chewable tab 500 mg  500 mg    albuterol inhaler 2 Puff  2 Puff    atorvastatin (Lipitor) tablet 40 mg  40 mg    levothyroxine (Synthroid) tablet 175 mcg  175 mcg    lisinopril (Prinivil) tablet 5 mg  5 mg    montelukast (Singulair) tablet 10 mg  10 mg    loratadine (Claritin) tablet 10 mg  10 mg    Pharmacy Consult Request ...Pain Management Review 1 Each  1 Each    ondansetron (Zofran) syringe/vial injection 4 mg  4 mg    diphenhydrAMINE (Benadryl) injection 25 mg  25 mg    haloperidol lactate (Haldol) injection 1 mg  1 mg    scopolamine (Transderm-Scop) patch 1 Patch  1 Patch    docusate sodium (Colace) capsule 100 mg  100 mg    senna-docusate (Pericolace Or Senokot S) 8.6-50 MG per tablet 1 Tablet  1 Tablet    senna-docusate (Pericolace Or Senokot S) 8.6-50 MG per tablet 1 Tablet  1 Tablet    polyethylene glycol/lytes (Miralax) Packet 1 Packet  1 Packet    magnesium hydroxide (Milk Of Magnesia) suspension 30 mL  30 mL    bisacodyl (Dulcolax) suppository 10 mg  10 mg    sodium phosphate (Fleet) enema 133 mL  1 Each    acetaminophen (Tylenol) tablet 650 mg  650 mg    Followed by    [START ON 4/2/2024] acetaminophen (Tylenol) tablet 650 mg  " 650 mg    oxyCODONE immediate-release (Roxicodone) tablet 5 mg  5 mg    Or    oxyCODONE immediate release (Roxicodone) tablet 10 mg  10 mg    Or    HYDROmorphone (Dilaudid) injection 0.5 mg  0.5 mg    ondansetron (Zofran ODT) dispertab 4 mg  4 mg    Pharmacy Consult Request ...Pain Management Review 1 Each  1 Each       Allergies:  Allergies   Allergen Reactions    Vancomycin Rash     rash         Physical Exam:  Vitals: BP (!) 130/98   Pulse 90   Temp 36.2 °C (97.2 °F) (Temporal)   Resp 18   Ht 1.651 m (5' 5\")   Wt 108 kg (237 lb 7 oz)   SpO2 99%   Gen: NAD, laying comfortably in bed, daughters in room   Head:  NC/AT  Eyes/ Nose/ Mouth: PERRLA, moist mucous membranes  Cardio: RRR, good distal perfusion, warm extremities  Pulm: normal respiratory effort, no cyanosis, on 1.5 L   Abd: Soft NTND  Ext: No peripheral edema. No calf tenderness. No clubbing. LLE immobilizer in place     Mental status:  A&Ox4 (person, place, date, situation) answers questions appropriately follows commands  Speech: fluent, no aphasia or dysarthria    CRANIAL NERVES:  2,3: visual acuity grossly intact, PERRL  3,4,6: EOMI bilaterally, no nystagmus or diplopia  5: sensation intact to light touch bilaterally and symmetric  7: no facial asymmetry  8: hearing grossly intact    Motor:      Upper Extremity  Myotome R L   Shoulder flexion C5 5/5 5/5   Elbow flexion C5 5/5 5/5   Wrist extension C6 5/5 5/5   Elbow extension C7 5/5 5/5   Finger flexion C8 5/5 5/5   Finger abduction T1 5/5 5/5     Lower Extremity Myotome R L   Hip flexion L2 5/5 1/5    Knee extension L3 5/5 Not tested   Ankle dorsiflexion L4 5/5 5/5   Toe extension L5 5/5 5/5   Ankle plantarflexion S1 5/5 5/5         Sensory:   intact to light touch through out    DTRs: 2+ in bilateral  biceps  No clonus at bilateral ankles  Negative Lora b/l     Tone: no spasticity noted    Coordination:   intact finger to nose bilaterally  intact fine motor with fingers " bilaterally      Labs: Reviewed and significant for   Recent Labs     03/27/24 2110 03/28/24 0311 03/29/24 0237 03/30/24  0521   RBC 3.17* 3.09* 2.97* 2.82*   HEMOGLOBIN 10.1* 9.7* 9.1* 8.9*   HEMATOCRIT 30.4* 29.6* 27.3* 26.5*   PLATELETCT 165 173 132* 163*   PROTHROMBTM 22.8*  --   --   --    APTT 54.9*  --   --   --    INR 1.99*  --   --   --      Recent Labs     03/28/24 0311 03/29/24 0237 03/30/24  0521   SODIUM 134* 129* 136   POTASSIUM 4.8 5.3 4.8   CHLORIDE 96 95* 99   CO2 23 22 24   GLUCOSE 125* 171* 133*   BUN 24* 37* 50*   CREATININE 1.37 2.15* 1.88*   CALCIUM 9.6 8.8 9.6     Recent Results (from the past 24 hour(s))   CREATINE KINASE    Collection Time: 03/29/24 10:22 AM   Result Value Ref Range    CPK Total 148 0 - 154 U/L   CBC WITH DIFFERENTIAL    Collection Time: 03/30/24  5:21 AM   Result Value Ref Range    WBC 19.0 (H) 4.8 - 10.8 K/uL    RBC 2.82 (L) 4.20 - 5.40 M/uL    Hemoglobin 8.9 (L) 12.0 - 16.0 g/dL    Hematocrit 26.5 (L) 37.0 - 47.0 %    MCV 94.0 81.4 - 97.8 fL    MCH 31.6 27.0 - 33.0 pg    MCHC 33.6 32.2 - 35.5 g/dL    RDW 48.8 35.9 - 50.0 fL    Platelet Count 163 (L) 164 - 446 K/uL    MPV 11.0 9.0 - 12.9 fL    Neutrophils-Polys 83.40 (H) 44.00 - 72.00 %    Lymphocytes 6.10 (L) 22.00 - 41.00 %    Monocytes 9.80 0.00 - 13.40 %    Eosinophils 0.00 0.00 - 6.90 %    Basophils 0.10 0.00 - 1.80 %    Immature Granulocytes 0.60 0.00 - 0.90 %    Nucleated RBC 0.00 0.00 - 0.20 /100 WBC    Neutrophils (Absolute) 15.86 (H) 1.82 - 7.42 K/uL    Lymphs (Absolute) 1.16 1.00 - 4.80 K/uL    Monos (Absolute) 1.87 (H) 0.00 - 0.85 K/uL    Eos (Absolute) 0.00 0.00 - 0.51 K/uL    Baso (Absolute) 0.01 0.00 - 0.12 K/uL    Immature Granulocytes (abs) 0.12 (H) 0.00 - 0.11 K/uL    NRBC (Absolute) 0.00 K/uL   Basic Metabolic Panel    Collection Time: 03/30/24  5:21 AM   Result Value Ref Range    Sodium 136 135 - 145 mmol/L    Potassium 4.8 3.6 - 5.5 mmol/L    Chloride 99 96 - 112 mmol/L    Co2 24 20 - 33 mmol/L     Glucose 133 (H) 65 - 99 mg/dL    Bun 50 (H) 8 - 22 mg/dL    Creatinine 1.88 (H) 0.50 - 1.40 mg/dL    Calcium 9.6 8.5 - 10.5 mg/dL    Anion Gap 13.0 7.0 - 16.0   CREATINE KINASE    Collection Time: 03/30/24  5:21 AM   Result Value Ref Range    CPK Total 234 (H) 0 - 154 U/L   ESTIMATED GFR    Collection Time: 03/30/24  5:21 AM   Result Value Ref Range    GFR (CKD-EPI) 26 (A) >60 mL/min/1.73 m 2         ASSESSMENT:  Patient is a 88 y.o. female admitted with left distal femur fracture     [facility] Code / Diagnosis to Support: 0008.2 - Orthopaedic Disorders: Status Post Femur (Shaft) Fracture  See DISPO details below for recommendations on appropriate level of rehab for this diagnosis.    Barriers to transfer include: Insurance authorization, TCCs to verify disposition, medical clearance and bed availability     Assessment and Plan:  Left distal femur fracture   - sustained in GLF   - images showed left distal femur fracture   - 3/28 ORIF for left distal femur periprosthetic fracture   - TTWB LLE, immobilzer for LLE at [facility]   - continue with PT/OT, will need ramp to access home, likely to need to function at  level if unable to adhere to WB precautions, family is planning to fly back to [place]     COPD   - 2 L at baseline   - currently stable on 1.5 L   -on home dose singulair     Leukocytosis   - up trending WBC  -3/30 WBC 19.0  - remains afebrile , on Tylenol   - primary team monitoring for infection, is not needing more O2  - consider UA to r/o UTI     HIMANSHU   - post op bump in Cr up to 2.15   - 3/30 Cr 1.88, improving after IVFs   - primary team monitoring, avoiding nephrotoxic agents     ABLA   - post op drop in Hgb   3/30 Hgb 8.9   - primary team monitoring CBC     Thrombocytopenia  - post op drop in plts   - 3/30 plts 163, improving   - primary team monitoring CBC     HTN   -on home dose coreg, dose decreased to 3.125  - continues on lisinopril     Afib   - on coreg, home dose decreased due to hypotension   -home Pradaxa  held due to HIMANSHU, on renally dose lovenox       DISPO:  - patient is currently functioning below their level of baseline, recommend post acute rehab  - recommend IRF level therapy with 3hr of therapy 5 days per week   - prior to acceptance to IRF, will need insurance auth BCBS, Cr improved, and WBC improved    - TCC to assist with insurance auth and DC support from daughters        Medical Complexity:  Left distal femur fracture   COPD   Leukocytosis   HIMANSHU   ABLA   HTN   Afib   Impaired mobility and ADLs         DVT PPX: Lovenox       Thank you for allowing us to participate in the care of this patient.     Patient was seen for 82 minutes on unit/floor of which > 50% of time was spent on counseling and coordination of care regarding the above, including prognosis, risk reduction, benefits of treatment, and options for next stage of care.    Natalia Roman D.O.   Physical Medicine and Rehabilitation     Please note that this dictation was created using voice recognition software. I have made every reasonable attempt to correct obvious errors, but there may be errors of grammar and possibly content that I did not discover before finalizing the note.

## 2024-03-30 NOTE — CARE PLAN
The patient is Stable - Low risk of patient condition declining or worsening      Problem: Knowledge Deficit - Standard  Goal: Patient and family/care givers will demonstrate understanding of plan of care, disease process/condition, diagnostic tests and medications  Outcome: Progressing     Problem: Skin Integrity  Goal: Skin integrity is maintained or improved  Outcome: Progressing     Shift Goals  Clinical Goals: Nausea management, IV fluids  Patient Goals: Rest and comfort  Family Goals: DERREK    Progress made toward(s) clinical / shift goals:      Pt alert and oriented. VSS. On O2 2L via nasal cannula. C/O nausea during meal, but refused medication at this time. No C/O pain during shift. IV access patent, infusing NS 75 ml/hr. Daughters at bedside, updated to POC. Pt sat to edge of bed with assistance, Reinforced education on fall precaution, call light within reach, bed alarm on. Pt calls for help.

## 2024-03-30 NOTE — CARE PLAN
The patient is Stable - Low risk of patient condition declining or worsening    Shift Goals  Clinical Goals: Pt will have pain, nausea managed throughout shift  Patient Goals: rest, nausea/ heartburn relief  Family Goals: DERREK    Progress made toward(s) clinical / shift goals:  Problem: Skin Integrity  Goal: Skin integrity is maintained or improved  Outcome: Progressing     Problem: Pain - Standard  Goal: Alleviation of pain or a reduction in pain to the patient’s comfort goal  Outcome: Progressing   Pt denies pain, educated on importance of scheduled pain medication, administered per MAR.  Problem: Fall Risk  Goal: Patient will remain free from falls  Outcome: Progressing   Pt remains free of falls  Patient is not progressing towards the following goals:

## 2024-03-30 NOTE — PROGRESS NOTES
Pt experienced 1 round of emesis with 200 mL of dark colored emesis. Pt requested medication to help with heartburn. Provider contacted, medicated per MAR.

## 2024-03-30 NOTE — PROGRESS NOTES
"Hospital Medicine Daily Progress Note    Date of Service  3/31/2024    Chief Complaint  Jaqui Sifuentes is a 88 y.o. female admitted 3/27/2024 with leg pain after a mechanical fall    Hospital Course  Jaqui Sifuentes is a 88 y.o. female who presented to [facility] after a ground level fall on 3/27/2024. She has a past medical history of COPD, chronic hypoxic respiratory failure on 2 L oxygen, A-fib, hypothyroidism,and  mild aortic stenosis. She was walking down stairs when she had a misstep and collapsed.  She did hit her head but denied losing consciousness.  She denied lightheadedness or dizziness.  She deniesd any nausea, vomiting, diarrhea, shortness of breath, back pain, fevers.  Her only other complaint was left hand pain.  The patient does take Pradaxa and last took it 3/27 morning.     X-ray of the femur found communicated displaced distal femoral metaphyseal fracture  CT scan of the head was negative  EKG found to be controlled A-fib    Interval Problem Update  Axox3, two daughters at bedside this am. She had an episode of emesis last night, nursing note reported \"dark\", patient denies any blood or coffee ground emesis, described more as bilious. She has had no further emesis, ate a banana this am tolerated it well. Nausea improving, she reports abdomen \"is a little sore from the vomiting\",  but no pain other than than, denies chest pain, no sob, no cough, no diarrhea. States she is feeling much better, she thinks the tylenol has been contributing to the nausea so we will stop this. She has no leg pain, denies numbness or tingling, her sensation is intact. Renal function improving, wbc did increase. Her family is enquiring about snf or rehab in [place], I mentioned that transport may be a barrier and delay this and I recommended that they look at local options at the same time, patient is supportive of this plan. Exam and vitals stable. ROS otherwise negative.     I have discussed this patient's plan of " care and discharge plan at IDT rounds today with Case Management, Nursing, Nursing leadership, and other members of the IDT team.    Consultants/Specialty  [facility]    Code Status  DNAR/DNI    Disposition  The patient is not medically cleared for discharge to home or a post-acute facility.      I have placed the appropriate orders for post-discharge needs.    Review of Systems  Review of Systems   Constitutional: Negative.  Negative for chills, diaphoresis, fever, malaise/fatigue and weight loss.   HENT: Negative.  Negative for sore throat.    Eyes: Negative.  Negative for blurred vision.   Respiratory: Negative.  Negative for cough and shortness of breath.    Cardiovascular: Negative.  Negative for chest pain, palpitations and leg swelling.   Gastrointestinal:  Positive for nausea and vomiting. Negative for abdominal pain.   Genitourinary: Negative.  Negative for dysuria.   Musculoskeletal:  Positive for falls. Negative for joint pain and myalgias.   Skin: Negative.  Negative for itching and rash.   Neurological: Negative.  Negative for dizziness, focal weakness, weakness and headaches.   Endo/Heme/Allergies: Negative.  Does not bruise/bleed easily.   Psychiatric/Behavioral: Negative.  Negative for depression, substance abuse and suicidal ideas.    All other systems reviewed and are negative.  Dictation #1  MRN:[id number]  CSN:[id number]      Physical Exam  Temp:  [36.1 °C (97 °F)-36.6 °C (97.9 °F)] 36.1 °C (97 °F)  Pulse:  [71-99] 99  Resp:  [18] 18  BP: (106-136)/(44-60) 136/56  SpO2:  [93 %-99 %] 99 %    Physical Exam  Vitals and nursing note reviewed. Exam conducted with a chaperone present.   Constitutional:       General: She is not in acute distress.     Appearance: Normal appearance. She is not diaphoretic.   HENT:      Head: Normocephalic.      Nose: Nose normal.      Mouth/Throat:      Mouth: Mucous membranes are moist.   Eyes:      Pupils: Pupils are equal, round, and reactive to light.   Cardiovascular:       Rate and Rhythm: Normal rate and regular rhythm.      Pulses: Normal pulses.      Heart sounds: Normal heart sounds.   Pulmonary:      Effort: Pulmonary effort is normal.      Breath sounds: Normal breath sounds.   Abdominal:      General: Abdomen is flat. Bowel sounds are normal.      Palpations: Abdomen is soft.   Musculoskeletal:         General: No swelling or deformity. Normal range of motion.      Comments: L leg surgical bandages in place, cdi   Skin:     General: Skin is warm and dry.      Capillary Refill: Capillary refill takes less than 2 seconds.   Neurological:      General: No focal deficit present.      Mental Status: She is alert and oriented to person, place, and time.      Cranial Nerves: No cranial nerve deficit.   Psychiatric:         Mood and Affect: Mood normal.         Behavior: Behavior normal.         Fluids    Intake/Output Summary (Last 24 hours) at 3/31/2024 0716  Last data filed at 3/31/2024 0500  Gross per 24 hour   Intake 1425 ml   Output 725 ml   Net 700 ml       Laboratory  Recent Labs     03/29/24  0237 03/30/24  0521   WBC 12.0* 19.0*   RBC 2.97* 2.82*   HEMOGLOBIN 9.1* 8.9*   HEMATOCRIT 27.3* 26.5*   MCV 91.9 94.0   MCH 30.6 31.6   MCHC 33.3 33.6   RDW 49.1 48.8   PLATELETCT 132* 163*   MPV 11.0 11.0     Recent Labs     03/29/24  0237 03/30/24  0521 03/31/24  0527   SODIUM 129* 136 134*   POTASSIUM 5.3 4.8 4.9   CHLORIDE 95* 99 102   CO2 22 24 23   GLUCOSE 171* 133* 100*   BUN 37* 50* 51*   CREATININE 2.15* 1.88* 1.41*   CALCIUM 8.8 9.6 8.9                     Imaging  [id number] VIEW   Final Result      Nonobstructive bowel gas pattern.      DX-PORTABLE FLUOROSCOPY < 1 HOUR   Final Result      Portable fluoroscopy utilized for 1 minute 20 seconds.         INTERPRETING LOCATION: [address] [place] [address], [address]      DX-FEMUR-2+ LEFT   Final Result      Digitized intraoperative radiograph is submitted for review. This examination is not for diagnostic purpose but for guidance  during a surgical procedure. Please see the patient's chart for full procedural details.         INTERPRETING LOCATION: [address], [place], [address]      DX-KNEE 2- LEFT   Final Result         1.  Comminuted displaced distal femoral metaphyseal fracture      CT-HEAD W/O   Final Result         1.  No acute intracranial abnormality is identified, there are nonspecific white matter changes, commonly associated with small vessel ischemic disease.  Associated mild cerebral atrophy is noted.   2.  Atherosclerosis.              Assessment/Plan  * Closed fracture of distal end of femur (HCC)- (present on admission)  Assessment & Plan  Pain control with oral and IV narcotics  S/p surgical repair with Dr Suero 3/28/24  DVT prophylaxis after surgery  PT OT  Pain control and supportive care    Thrombocytopenia ([facility])  Assessment & Plan  Mild  Following  Cbc in am    Leukocytosis  Assessment & Plan  Clinically improved  Likely stress reaction from emesis and surgery  Following  Will also check viral uri panel, could have viral gastroenteritis that is contributing  Will check blood cultures  Cbc in am    Hyponatremia  Assessment & Plan  Likely due to dehydration  resolved    Nausea and vomiting  Assessment & Plan  Contributing to dehydration and therefore acute renal failure  Addressing  Suspect pain meds contributed, will dc tylenol  KUB showed no acute abnormalities  No associated pain  Following  Supportive care    Acute renal failure (ARF) ([facility])  Assessment & Plan  Clinically due to dehydration  Emesis was contributing - now resolved  Some improvement overnight  Will continue to follow closely  Continue to hold diuretics   Avoid nephrotoxins  No evidence of retention  Renal dose meds  Cpk minimally elevated, will repeat this and bmp in am    COPD (chronic obstructive pulmonary disease) ([facility])  Assessment & Plan  Not in exacerbation  Continue home inhalers    Encounter for preoperative assessment  Assessment & Plan  Admit  to:    Orthopedic/Med-Surg floor since no major co-morbidities.     Cardiovascular:   Patient has history of CHF: Continue home beta blocker and rate control medications.   Pre-op EKG: Yes    Pulmonary:  Oxygen per protocol  Incentive Spirometer    GI:   No history of cirrhosis. Standard bowel regimen. Hold for loose stools.    Renal:   IV fluids: No fluids - risk of fluid overload    Labs: Metabolic Panel every 6 hours for 24 hours for significant electrolyte derangements    Musculoskeletal:   Check 25 OH vitamin D level. If 31-40 pg/mL, consider starting vitamin D3 1000 IU PO daily. If 20-30 pg/mL, consider vitamin D3 2000 IU PO daily. If <20 pg/mL, vitamin D2 50,000 IU weekly x 8 weeks then 2000 IU PO daily.    Neurologic:   Pain Control: Neuro checks every 4 hours  Avoid fentanyl (short-acting)    Hematologic:  Plan on pharmacologic DVT prophylaxis post operative day #1. Hold for decreasing hemoglobin. Notify provider for hemoglobin less than 8.  Order preoperative type and cross.   Hgb every 6 hours if high bleeding risk.   If patient was on anticoagulation prior to arrival risks and benefits will be weighed by teams including surgery, hospitalist, geriatrics, and anesthesia for delaying surgery more than 24 hours.   On anticoagulation prior to arrival: Yes: Significant preoperative anemia or INR greater than 1.5, draw hgb/hct every 4 hours for 24 hours, INR daily for 2 days.     Medical Assessment Risk:  High    Surgical Risk:   Intermediate      Hypertension  Assessment & Plan  Decreasing home bp meds due to mild hypotension, see above, following closely and adjusting as needed  Holding diuretics    Aortic stenosis  Assessment & Plan  Mild that was found on echo in 2022    Chronic hypoxic respiratory failure (HCC)  Assessment & Plan  Currently stable on 1L which is below her baseline of 2L  No evidence of acute exacerbation    Atrial fibrillation (HCC)  Assessment & Plan  Rate controlled  Continue Coreg but  decrease dose due to mild hypotension, following closely and adjusting dose as needed  Hold Pradaxa- currently due to acute renal failure, she is on ppd of renal dosed lovenox  following    Fall  Assessment & Plan  Patient denies any other symptoms including back pain headache  Reports it was purely mechanical  PT and OT           VTE prophylaxis: pradaxa held for surgery, renal dose lovenox for now    I have performed a physical exam and reviewed and updated ROS and Plan today (3/31/2024). In review of yesterday's note (3/30/2024), there are no changes except as documented above.

## 2024-03-31 VITALS
HEIGHT: 65 IN | BODY MASS INDEX: 40.62 KG/M2 | OXYGEN SATURATION: 98 % | HEART RATE: 97 BPM | RESPIRATION RATE: 18 BRPM | TEMPERATURE: 98 F | DIASTOLIC BLOOD PRESSURE: 57 MMHG | SYSTOLIC BLOOD PRESSURE: 127 MMHG | WEIGHT: 243.83 LBS

## 2024-03-31 PROBLEM — D64.9 ANEMIA: Status: ACTIVE | Noted: 2024-03-31

## 2024-03-31 LAB
ALBUMIN SERPL BCP-MCNC: 2.6 G/DL (ref 3.2–4.9)
ALBUMIN/GLOB SERPL: 1.2 G/DL
ALP SERPL-CCNC: 73 U/L (ref 30–99)
ALT SERPL-CCNC: 6 U/L (ref 2–50)
ANION GAP SERPL CALC-SCNC: 9 MMOL/L (ref 7–16)
AST SERPL-CCNC: 20 U/L (ref 12–45)
BACTERIA BLD CULT: NORMAL
BACTERIA BLD CULT: NORMAL
BASOPHILS # BLD AUTO: 0.1 % (ref 0–1.8)
BASOPHILS # BLD: 0.01 K/UL (ref 0–0.12)
BILIRUB SERPL-MCNC: 0.5 MG/DL (ref 0.1–1.5)
BUN SERPL-MCNC: 51 MG/DL (ref 8–22)
CALCIUM ALBUM COR SERPL-MCNC: 10 MG/DL (ref 8.5–10.5)
CALCIUM SERPL-MCNC: 8.9 MG/DL (ref 8.5–10.5)
CHLORIDE SERPL-SCNC: 102 MMOL/L (ref 96–112)
CO2 SERPL-SCNC: 23 MMOL/L (ref 20–33)
CREAT SERPL-MCNC: 1.41 MG/DL (ref 0.5–1.4)
EOSINOPHIL # BLD AUTO: 0.01 K/UL (ref 0–0.51)
EOSINOPHIL NFR BLD: 0.1 % (ref 0–6.9)
ERYTHROCYTE [DISTWIDTH] IN BLOOD BY AUTOMATED COUNT: 46.5 FL (ref 35.9–50)
GFR SERPLBLD CREATININE-BSD FMLA CKD-EPI: 36 ML/MIN/1.73 M 2
GLOBULIN SER CALC-MCNC: 2.1 G/DL (ref 1.9–3.5)
GLUCOSE SERPL-MCNC: 100 MG/DL (ref 65–99)
HCT VFR BLD AUTO: 20.3 % (ref 37–47)
HGB BLD-MCNC: 7.1 G/DL (ref 12–16)
IMM GRANULOCYTES # BLD AUTO: 0.09 K/UL (ref 0–0.11)
IMM GRANULOCYTES NFR BLD AUTO: 0.6 % (ref 0–0.9)
LYMPHOCYTES # BLD AUTO: 1.26 K/UL (ref 1–4.8)
LYMPHOCYTES NFR BLD: 7.9 % (ref 22–41)
MCH RBC QN AUTO: 32.6 PG (ref 27–33)
MCHC RBC AUTO-ENTMCNC: 35 G/DL (ref 32.2–35.5)
MCV RBC AUTO: 93.1 FL (ref 81.4–97.8)
MONOCYTES # BLD AUTO: 1.51 K/UL (ref 0–0.85)
MONOCYTES NFR BLD AUTO: 9.5 % (ref 0–13.4)
NEUTROPHILS # BLD AUTO: 13.03 K/UL (ref 1.82–7.42)
NEUTROPHILS NFR BLD: 81.8 % (ref 44–72)
NRBC # BLD AUTO: 0 K/UL
NRBC BLD-RTO: 0 /100 WBC (ref 0–0.2)
PLATELET # BLD AUTO: 124 K/UL (ref 164–446)
PMV BLD AUTO: 10.8 FL (ref 9–12.9)
POTASSIUM SERPL-SCNC: 4.9 MMOL/L (ref 3.6–5.5)
PROT SERPL-MCNC: 4.7 G/DL (ref 6–8.2)
RBC # BLD AUTO: 2.18 M/UL (ref 4.2–5.4)
SIGNIFICANT IND 70042: NORMAL
SIGNIFICANT IND 70042: NORMAL
SITE SITE: NORMAL
SITE SITE: NORMAL
SODIUM SERPL-SCNC: 134 MMOL/L (ref 135–145)
SOURCE SOURCE: NORMAL
SOURCE SOURCE: NORMAL
WBC # BLD AUTO: 15.9 K/UL (ref 4.8–10.8)

## 2024-03-31 PROCEDURE — 99233 SBSQ HOSP IP/OBS HIGH 50: CPT | Performed by: HOSPITALIST

## 2024-03-31 PROCEDURE — 85025 COMPLETE CBC W/AUTO DIFF WBC: CPT

## 2024-03-31 PROCEDURE — 700111 HCHG RX REV CODE 636 W/ 250 OVERRIDE (IP): Performed by: HOSPITALIST

## 2024-03-31 PROCEDURE — 700102 HCHG RX REV CODE 250 W/ 637 OVERRIDE(OP): Performed by: HOSPITALIST

## 2024-03-31 PROCEDURE — A9270 NON-COVERED ITEM OR SERVICE: HCPCS | Performed by: NURSE PRACTITIONER

## 2024-03-31 PROCEDURE — 770006 HCHG ROOM/CARE - MED/SURG/GYN SEMI*

## 2024-03-31 PROCEDURE — A9270 NON-COVERED ITEM OR SERVICE: HCPCS | Performed by: HOSPITALIST

## 2024-03-31 PROCEDURE — 700102 HCHG RX REV CODE 250 W/ 637 OVERRIDE(OP): Performed by: ORTHOPAEDIC SURGERY

## 2024-03-31 PROCEDURE — 700102 HCHG RX REV CODE 250 W/ 637 OVERRIDE(OP): Performed by: NURSE PRACTITIONER

## 2024-03-31 PROCEDURE — 700105 HCHG RX REV CODE 258: Performed by: HOSPITALIST

## 2024-03-31 PROCEDURE — A9270 NON-COVERED ITEM OR SERVICE: HCPCS | Performed by: ORTHOPAEDIC SURGERY

## 2024-03-31 PROCEDURE — 36415 COLL VENOUS BLD VENIPUNCTURE: CPT

## 2024-03-31 PROCEDURE — 80053 COMPREHEN METABOLIC PANEL: CPT

## 2024-03-31 RX ORDER — BISACODYL 10 MG
10 SUPPOSITORY, RECTAL RECTAL ONCE
Status: DISCONTINUED | OUTPATIENT
Start: 2024-03-31 | End: 2024-03-31

## 2024-03-31 RX ADMIN — LISINOPRIL 5 MG: 5 TABLET ORAL at 04:32

## 2024-03-31 RX ADMIN — DOCUSATE SODIUM 100 MG: 100 CAPSULE, LIQUID FILLED ORAL at 16:34

## 2024-03-31 RX ADMIN — LEVOTHYROXINE SODIUM 175 MCG: 0.17 TABLET ORAL at 04:32

## 2024-03-31 RX ADMIN — CARVEDILOL 3.12 MG: 3.12 TABLET, FILM COATED ORAL at 16:34

## 2024-03-31 RX ADMIN — CARVEDILOL 3.12 MG: 3.12 TABLET, FILM COATED ORAL at 07:27

## 2024-03-31 RX ADMIN — LORATADINE 10 MG: 10 TABLET ORAL at 04:32

## 2024-03-31 RX ADMIN — DOCUSATE SODIUM 100 MG: 100 CAPSULE, LIQUID FILLED ORAL at 04:32

## 2024-03-31 RX ADMIN — ENOXAPARIN SODIUM 30 MG: 100 INJECTION SUBCUTANEOUS at 16:34

## 2024-03-31 RX ADMIN — ATORVASTATIN CALCIUM 40 MG: 40 TABLET, FILM COATED ORAL at 21:02

## 2024-03-31 RX ADMIN — ANTACID TABLETS 500 MG: 500 TABLET, CHEWABLE ORAL at 21:09

## 2024-03-31 RX ADMIN — DOCUSATE SODIUM 50 MG AND SENNOSIDES 8.6 MG 1 TABLET: 8.6; 5 TABLET, FILM COATED ORAL at 21:01

## 2024-03-31 RX ADMIN — SODIUM CHLORIDE: 9 INJECTION, SOLUTION INTRAVENOUS at 04:02

## 2024-03-31 RX ADMIN — BISACODYL 10 MG: 10 SUPPOSITORY RECTAL at 11:40

## 2024-03-31 RX ADMIN — ANTACID TABLETS 500 MG: 500 TABLET, CHEWABLE ORAL at 09:28

## 2024-03-31 ASSESSMENT — ENCOUNTER SYMPTOMS
CHILLS: 0
ABDOMINAL PAIN: 0
RESPIRATORY NEGATIVE: 1
DIAPHORESIS: 0
BLURRED VISION: 0
SORE THROAT: 0
PSYCHIATRIC NEGATIVE: 1
NAUSEA: 1
EYES NEGATIVE: 1
CONSTITUTIONAL NEGATIVE: 1
WEIGHT LOSS: 0
FOCAL WEAKNESS: 0
DEPRESSION: 0
CARDIOVASCULAR NEGATIVE: 1
HEADACHES: 0
COUGH: 0
DIZZINESS: 0
VOMITING: 0
WEAKNESS: 0
MYALGIAS: 0
PALPITATIONS: 0
FEVER: 0
BRUISES/BLEEDS EASILY: 0
NEUROLOGICAL NEGATIVE: 1
SHORTNESS OF BREATH: 0
FALLS: 1

## 2024-03-31 ASSESSMENT — PAIN DESCRIPTION - PAIN TYPE
TYPE: ACUTE PAIN
TYPE: ACUTE PAIN

## 2024-03-31 ASSESSMENT — LIFESTYLE VARIABLES: SUBSTANCE_ABUSE: 0

## 2024-03-31 NOTE — ASSESSMENT & PLAN NOTE
H/h decreasing, no melena or hematochezia  The blood loss is multifactorial, due to multiple blood draws (sample this am clotted and was redone, b/c, along with expected perioperative blood loss and from hemodilution) will stop fluids  Following  Cbc in am

## 2024-03-31 NOTE — PROGRESS NOTES
[facility] Medicine Daily Progress Note    Date of Service  3/31/2024    Chief Complaint  Jaqui Sifuentes is a 88 y.o. female admitted 3/27/2024 with leg pain after a mechanical fall    Hospital Course  Jaqui Sifuentes is a 88 y.o. female who presented to [facility] after a ground level fall on 3/27/2024. She has a past medical history of COPD, chronic hypoxic respiratory failure on 2 L oxygen, A-fib, hypothyroidism,and  mild aortic stenosis. She was walking down stairs when she had a misstep and collapsed.  She did hit her head but denied losing consciousness.  She denied lightheadedness or dizziness.  She deniesd any nausea, vomiting, diarrhea, shortness of breath, back pain, fevers.  Her only other complaint was left hand pain.  The patient does take Pradaxa and last took it 3/27 morning.     X-ray of the femur found communicated displaced distal femoral metaphyseal fracture  CT scan of the head was negative  EKG found to be controlled A-fib    Interval Problem Update  Axox3, she states she is feeling much better today, no further emesis, mild nausea, + flatus, no chest or abdominal pain, no surgical site pain. Vitals and exam stable, ros otherwise negative. I discussed patient with ortho, they will signs off and follow up with her as an outpatient    I have discussed this patient's plan of care and discharge plan at IDT rounds today with Case Management, Nursing, Nursing leadership, and other members of the IDT team.    Consultants/Specialty  [place]    Code Status  DNAR/DNI    Disposition  Medically Cleared  I have placed the appropriate orders for post-discharge needs.    Review of Systems  Review of Systems   Constitutional: Negative.  Negative for chills, diaphoresis, fever, malaise/fatigue and weight loss.   HENT: Negative.  Negative for sore throat.    Eyes: Negative.  Negative for blurred vision.   Respiratory: Negative.  Negative for cough and shortness of breath.    Cardiovascular: Negative.  Negative  for chest pain, palpitations and leg swelling.   Gastrointestinal:  Positive for nausea. Negative for abdominal pain and vomiting.   Genitourinary: Negative.  Negative for dysuria.   Musculoskeletal:  Positive for falls. Negative for joint pain and myalgias.   Skin: Negative.  Negative for itching and rash.   Neurological: Negative.  Negative for dizziness, focal weakness, weakness and headaches.   Endo/Heme/Allergies: Negative.  Does not bruise/bleed easily.   Psychiatric/Behavioral: Negative.  Negative for depression, substance abuse and suicidal ideas.    All other systems reviewed and are negative.       Physical Exam  Temp:  [36.1 °C (97 °F)-36.6 °C (97.9 °F)] 36.1 °C (97 °F)  Pulse:  [71-99] 99  Resp:  [18] 18  BP: (106-136)/(44-60) 136/56  SpO2:  [93 %-99 %] 99 %    Physical Exam  Vitals and nursing note reviewed. Exam conducted with a chaperone present.   Constitutional:       General: She is not in acute distress.     Appearance: Normal appearance. She is not diaphoretic.   HENT:      Head: Normocephalic.      Nose: Nose normal.      Mouth/Throat:      Mouth: Mucous membranes are moist.   Eyes:      Pupils: Pupils are equal, round, and reactive to light.   Cardiovascular:      Rate and Rhythm: Normal rate and regular rhythm.      Pulses: Normal pulses.      Heart sounds: Normal heart sounds.   Pulmonary:      Effort: Pulmonary effort is normal.      Breath sounds: Normal breath sounds.   Abdominal:      General: Abdomen is flat. Bowel sounds are normal.      Palpations: Abdomen is soft.   Musculoskeletal:         General: No swelling or deformity. Normal range of motion.      Comments: L leg surgical bandages in place, cdi   Skin:     General: Skin is warm and dry.      Capillary Refill: Capillary refill takes less than 2 seconds.   Neurological:      General: No focal deficit present.      Mental Status: She is alert and oriented to person, place, and time.      Cranial Nerves: No cranial nerve deficit.    Psychiatric:         Mood and Affect: Mood normal.         Behavior: Behavior normal.         Fluids    Intake/Output Summary (Last 24 hours) at 3/31/2024 1149  Last data filed at 3/31/2024 0900  Gross per 24 hour   Intake 1425 ml   Output 725 ml   Net 700 ml       Laboratory  Recent Labs     03/29/24 0237 03/30/24  0521 03/31/24  1007   WBC 12.0* 19.0* 15.9*   RBC 2.97* 2.82* 2.18*   HEMOGLOBIN 9.1* 8.9* 7.1*   HEMATOCRIT 27.3* 26.5* 20.3*   MCV 91.9 94.0 93.1   MCH 30.6 31.6 32.6   MCHC 33.3 33.6 35.0   RDW 49.1 48.8 46.5   PLATELETCT 132* 163* 124*   MPV 11.0 11.0 10.8     Recent Labs     03/29/24 0237 03/30/24  0521 03/31/24  0527   SODIUM 129* 136 134*   POTASSIUM 5.3 4.8 4.9   CHLORIDE 95* 99 102   CO2 22 24 23   GLUCOSE 171* 133* 100*   BUN 37* 50* 51*   CREATININE 2.15* 1.88* 1.41*   CALCIUM 8.8 9.6 8.9                     Imaging  [id number] VIEW   Final Result      Nonobstructive bowel gas pattern.      DX-PORTABLE FLUOROSCOPY < 1 HOUR   Final Result      Portable fluoroscopy utilized for 1 minute 20 seconds.         INTERPRETING LOCATION: [address] [place] [address], [address]      DX-FEMUR-2+ LEFT   Final Result      Digitized intraoperative radiograph is submitted for review. This examination is not for diagnostic purpose but for guidance during a surgical procedure. Please see the patient's chart for full procedural details.         INTERPRETING LOCATION: [address] [facility], [address]      DX-KNEE 2- LEFT   Final Result         1.  Comminuted displaced distal femoral metaphyseal fracture      CT-HEAD W/O   Final Result         1.  No acute intracranial abnormality is identified, there are nonspecific white matter changes, commonly associated with small vessel ischemic disease.  Associated mild cerebral atrophy is noted.   2.  Atherosclerosis.              Assessment/Plan  * Closed fracture of distal end of femur (HCC)- (present on admission)  Assessment & Plan  Pain control with oral and IV narcotics  S/p  surgical repair with Dr Suero 3/28/24  DVT prophylaxis after surgery  PT OT  Pain control and supportive care    Anemia  Assessment & Plan  H/h decreasing, no melena or hematochezia  The blood loss is multifactorial, due to multiple blood draws (sample this am clotted and was redone, b/c, along with expected perioperative blood loss and from hemodilution) will stop fluids  Following  Cbc in am    Thrombocytopenia (HCC)  Assessment & Plan  Mild  Decreasing slowly  Cbc in am    Leukocytosis  Assessment & Plan  Clinically improved  Likely stress reaction from emesis and surgery  Improving  Will continue to follow  Will viral uri panel negative, could have viral gastroenteritis that is contributing  B/c so far negative, following  Cbc in am    Hyponatremia  Assessment & Plan  Likely due to dehydration  resolved    Nausea and vomiting  Assessment & Plan  Contributing to dehydration and therefore acute renal failure  Addressing  Suspect pain meds contributed, now resolving  KUB showed no acute abnormalities  No associated pain  Following  Supportive care    Acute renal failure (ARF) ([facility])  Assessment & Plan  Clinically due to dehydration  Emesis was contributing - now resolved  Continues to improve, will stop iv fluids, continue to monitor fluid status closely  Will continue to follow closely  Continue to hold diuretics, may resume tomorrow if indicated  Bmp in am  Avoid nephrotoxins  No evidence of retention  Renal dose meds  Cpk minimally elevated    COPD (chronic obstructive pulmonary disease) ([facility])  Assessment & Plan  Not in exacerbation  Continue home inhalers    Encounter for preoperative assessment  Assessment & Plan  Admit to:    Orthopedic/Med-Surg floor since no major co-morbidities.     Cardiovascular:   Patient has history of CHF: Continue home beta blocker and rate control medications.   Pre-op EKG: Yes    Pulmonary:  Oxygen per protocol  Incentive Spirometer    GI:   No history of cirrhosis. Standard  bowel regimen. Hold for loose stools.    Renal:   IV fluids: No fluids - risk of fluid overload    Labs: Metabolic Panel every 6 hours for 24 hours for significant electrolyte derangements    Musculoskeletal:   Check 25 OH vitamin D level. If 31-40 pg/mL, consider starting vitamin D3 1000 IU PO daily. If 20-30 pg/mL, consider vitamin D3 2000 IU PO daily. If <20 pg/mL, vitamin D2 50,000 IU weekly x 8 weeks then 2000 IU PO daily.    Neurologic:   Pain Control: Neuro checks every 4 hours  Avoid fentanyl (short-acting)    Hematologic:  Plan on pharmacologic DVT prophylaxis post operative day #1. Hold for decreasing hemoglobin. Notify provider for hemoglobin less than 8.  Order preoperative type and cross.   Hgb every 6 hours if high bleeding risk.   If patient was on anticoagulation prior to arrival risks and benefits will be weighed by teams including surgery, hospitalist, geriatrics, and anesthesia for delaying surgery more than 24 hours.   On anticoagulation prior to arrival: Yes: Significant preoperative anemia or INR greater than 1.5, draw hgb/hct every 4 hours for 24 hours, INR daily for 2 days.     Medical Assessment Risk:  High    Surgical Risk:   Intermediate      Hypertension  Assessment & Plan  Decreasing home bp meds due to mild hypotension, see above, following closely and adjusting as needed  Holding diuretics, see above    Aortic stenosis  Assessment & Plan  Mild that was found on echo in 2022    Chronic hypoxic respiratory failure (HCC)  Assessment & Plan  Currently stable on 1L which is below her baseline of 2L  No evidence of acute exacerbation    Atrial fibrillation (HCC)  Assessment & Plan  Rate controlled  Continue Coreg but decrease dose due to mild hypotension, following closely and adjusting dose as needed  Hold Pradaxa- currently due to acute renal failure, she is on ppd of renal dosed lovenox  Continue to follow    Fall  Assessment & Plan  Patient denies any other symptoms including back pain  headache  Reports it was purely mechanical  PT and OT           VTE prophylaxis: pradaxa held for surgery, renal dose lovenox for now    I have performed a physical exam and reviewed and updated ROS and Plan today (3/31/2024). In review of yesterday's note (3/30/2024), there are no changes except as documented above.

## 2024-03-31 NOTE — CARE PLAN
The patient is Stable - Low risk of patient condition declining or worsening    Shift Goals  Clinical Goals: Pt will have pain and nausea controlled throughout shift  Patient Goals: rest, comfort  Family Goals: DERREK    Problem: Skin Integrity  Goal: Skin integrity is maintained or improved  Outcome: Progressing     Problem: Pain - Standard  Goal: Alleviation of pain or a reduction in pain to the patient’s comfort goal  Outcome: Progressing     Progress made toward(s) clinical / shift goals:    Pt denies nausea, reports that pain is improving. Hospitalist contacted, PRN Tylenol ordered per pt request. Repositioned at start of shift. Uses call light when in need of assistance. Pt resting quietly at this time.      Patient is not progressing towards the following goals:

## 2024-03-31 NOTE — PROGRESS NOTES
Patient refused bed strip alarm despite encouragement and education. Calls appropriately, reinforced use of call light. Hourly rounding done.

## 2024-03-31 NOTE — CARE PLAN
Pt ao x 4, hard of hearing, pleasant and cooperative.  Supportive family at bed side.  Pt meds passed per MAR, no injuries this shift.  Call light in reach, bed at lowest position, no needs at this time.      The patient is Stable - Low risk of patient condition declining or worsening    Shift Goals  Clinical Goals: N/V and pain mgmt  Patient Goals: rest, comfort, dc  Family Goals: DERREK    Progress made toward(s) clinical / shift goals:  pt meds passed per MAR, no injuries this shift      Problem: Knowledge Deficit - Standard  Goal: Patient and family/care givers will demonstrate understanding of plan of care, disease process/condition, diagnostic tests and medications  Outcome: Progressing     Problem: Skin Integrity  Goal: Skin integrity is maintained or improved  Outcome: Progressing       Patient is not progressing towards the following goals:      Problem: Pain - Standard  Goal: Alleviation of pain or a reduction in pain to the patient’s comfort goal  Outcome: Not Met     Problem: Fall Risk  Goal: Patient will remain free from falls  Outcome: Not Met

## 2024-03-31 NOTE — DIETARY
NUTRITION SERVICES: BMI - Pt with BMI >40 (=Body mass index is 40.58 kg/m².), Class III obesity. Weight loss counseling not appropriate in acute care setting. RECOMMEND - If appropriate at DC please refer to outpatient nutrition services for weight management.

## 2024-04-01 ENCOUNTER — HOSPITAL ENCOUNTER (INPATIENT)
Facility: REHABILITATION | Age: 89
End: 2024-04-01
Attending: PHYSICAL MEDICINE & REHABILITATION | Admitting: PHYSICAL MEDICINE & REHABILITATION
Payer: COMMERCIAL

## 2024-04-01 LAB
ABO GROUP BLD: ABNORMAL
ANION GAP SERPL CALC-SCNC: 7 MMOL/L (ref 7–16)
BARCODED ABORH UBTYP: 7300
BARCODED ABORH UBTYP: 7300
BARCODED PRD CODE UBPRD: NORMAL
BARCODED PRD CODE UBPRD: NORMAL
BARCODED UNIT NUM UBUNT: NORMAL
BARCODED UNIT NUM UBUNT: NORMAL
BASOPHILS # BLD AUTO: 0.1 % (ref 0–1.8)
BASOPHILS # BLD: 0.01 K/UL (ref 0–0.12)
BLD GP AB SCN SERPL QL: ABNORMAL
BUN SERPL-MCNC: 42 MG/DL (ref 8–22)
CALCIUM SERPL-MCNC: 9.1 MG/DL (ref 8.5–10.5)
CHLORIDE SERPL-SCNC: 103 MMOL/L (ref 96–112)
CO2 SERPL-SCNC: 24 MMOL/L (ref 20–33)
COMPONENT R 8504R: NORMAL
COMPONENT R 8504R: NORMAL
CREAT SERPL-MCNC: 0.95 MG/DL (ref 0.5–1.4)
EOSINOPHIL # BLD AUTO: 0.05 K/UL (ref 0–0.51)
EOSINOPHIL NFR BLD: 0.4 % (ref 0–6.9)
ERYTHROCYTE [DISTWIDTH] IN BLOOD BY AUTOMATED COUNT: 48.9 FL (ref 35.9–50)
ERYTHROCYTE [DISTWIDTH] IN BLOOD BY AUTOMATED COUNT: 51.6 FL (ref 35.9–50)
GFR SERPLBLD CREATININE-BSD FMLA CKD-EPI: 58 ML/MIN/1.73 M 2
GLUCOSE SERPL-MCNC: 105 MG/DL (ref 65–99)
HCT VFR BLD AUTO: 17.4 % (ref 37–47)
HCT VFR BLD AUTO: 20 % (ref 37–47)
HGB BLD-MCNC: 5.7 G/DL (ref 12–16)
HGB BLD-MCNC: 6.8 G/DL (ref 12–16)
HGB BLD-MCNC: 6.8 G/DL (ref 12–16)
HGB RETIC QN AUTO: 32.3 PG/CELL (ref 29–35)
HGB RETIC QN AUTO: 33.1 PG/CELL (ref 29–35)
IMM GRANULOCYTES # BLD AUTO: 0.12 K/UL (ref 0–0.11)
IMM GRANULOCYTES NFR BLD AUTO: 1 % (ref 0–0.9)
IMM RETICS NFR: 25.5 % (ref 2.6–16.1)
IMM RETICS NFR: 27.8 % (ref 2.6–16.1)
IRON SATN MFR SERPL: 14 % (ref 15–55)
IRON SATN MFR SERPL: 16 % (ref 15–55)
IRON SERPL-MCNC: 22 UG/DL (ref 40–170)
IRON SERPL-MCNC: 22 UG/DL (ref 40–170)
LYMPHOCYTES # BLD AUTO: 1.05 K/UL (ref 1–4.8)
LYMPHOCYTES NFR BLD: 8.5 % (ref 22–41)
MCH RBC QN AUTO: 31.7 PG (ref 27–33)
MCH RBC QN AUTO: 32.2 PG (ref 27–33)
MCHC RBC AUTO-ENTMCNC: 32.8 G/DL (ref 32.2–35.5)
MCHC RBC AUTO-ENTMCNC: 34.5 G/DL (ref 32.2–35.5)
MCV RBC AUTO: 93.5 FL (ref 81.4–97.8)
MCV RBC AUTO: 96.7 FL (ref 81.4–97.8)
MONOCYTES # BLD AUTO: 0.97 K/UL (ref 0–0.85)
MONOCYTES NFR BLD AUTO: 7.9 % (ref 0–13.4)
NEUTROPHILS # BLD AUTO: 10.13 K/UL (ref 1.82–7.42)
NEUTROPHILS NFR BLD: 82.1 % (ref 44–72)
NRBC # BLD AUTO: 0 K/UL
NRBC BLD-RTO: 0 /100 WBC (ref 0–0.2)
PLATELET # BLD AUTO: 146 K/UL (ref 164–446)
PLATELET # BLD AUTO: 171 K/UL (ref 164–446)
PMV BLD AUTO: 10.4 FL (ref 9–12.9)
PMV BLD AUTO: 11.3 FL (ref 9–12.9)
POTASSIUM SERPL-SCNC: 4.9 MMOL/L (ref 3.6–5.5)
PRODUCT TYPE UPROD: NORMAL
PRODUCT TYPE UPROD: NORMAL
RBC # BLD AUTO: 1.8 M/UL (ref 4.2–5.4)
RBC # BLD AUTO: 2.14 M/UL (ref 4.2–5.4)
RETICS # AUTO: 0.08 M/UL (ref 0.04–0.12)
RETICS # AUTO: 0.09 M/UL (ref 0.04–0.12)
RETICS/RBC NFR: 4 % (ref 0.8–2.6)
RETICS/RBC NFR: 4.3 % (ref 0.8–2.6)
RH BLD: ABNORMAL
SODIUM SERPL-SCNC: 134 MMOL/L (ref 135–145)
TIBC SERPL-MCNC: 141 UG/DL (ref 250–450)
TIBC SERPL-MCNC: 154 UG/DL (ref 250–450)
UIBC SERPL-MCNC: 119 UG/DL (ref 110–370)
UIBC SERPL-MCNC: 132 UG/DL (ref 110–370)
UNIT STATUS USTAT: NORMAL
UNIT STATUS USTAT: NORMAL
WBC # BLD AUTO: 12.3 K/UL (ref 4.8–10.8)
WBC # BLD AUTO: 12.5 K/UL (ref 4.8–10.8)

## 2024-04-01 PROCEDURE — 85027 COMPLETE CBC AUTOMATED: CPT

## 2024-04-01 PROCEDURE — 36430 TRANSFUSION BLD/BLD COMPNT: CPT

## 2024-04-01 PROCEDURE — 770006 HCHG ROOM/CARE - MED/SURG/GYN SEMI*

## 2024-04-01 PROCEDURE — 700102 HCHG RX REV CODE 250 W/ 637 OVERRIDE(OP): Performed by: HOSPITALIST

## 2024-04-01 PROCEDURE — 700102 HCHG RX REV CODE 250 W/ 637 OVERRIDE(OP)

## 2024-04-01 PROCEDURE — 700111 HCHG RX REV CODE 636 W/ 250 OVERRIDE (IP): Performed by: HOSPITALIST

## 2024-04-01 PROCEDURE — A9270 NON-COVERED ITEM OR SERVICE: HCPCS | Performed by: ORTHOPAEDIC SURGERY

## 2024-04-01 PROCEDURE — 80048 BASIC METABOLIC PNL TOTAL CA: CPT

## 2024-04-01 PROCEDURE — 85046 RETICYTE/HGB CONCENTRATE: CPT | Mod: 91

## 2024-04-01 PROCEDURE — 86923 COMPATIBILITY TEST ELECTRIC: CPT

## 2024-04-01 PROCEDURE — 86900 BLOOD TYPING SEROLOGIC ABO: CPT

## 2024-04-01 PROCEDURE — 83550 IRON BINDING TEST: CPT

## 2024-04-01 PROCEDURE — 99233 SBSQ HOSP IP/OBS HIGH 50: CPT | Performed by: HOSPITALIST

## 2024-04-01 PROCEDURE — P9016 RBC LEUKOCYTES REDUCED: HCPCS | Mod: 91

## 2024-04-01 PROCEDURE — 86850 RBC ANTIBODY SCREEN: CPT

## 2024-04-01 PROCEDURE — A9270 NON-COVERED ITEM OR SERVICE: HCPCS | Performed by: HOSPITALIST

## 2024-04-01 PROCEDURE — 86901 BLOOD TYPING SEROLOGIC RH(D): CPT

## 2024-04-01 PROCEDURE — 83540 ASSAY OF IRON: CPT | Mod: 91

## 2024-04-01 PROCEDURE — 85025 COMPLETE CBC W/AUTO DIFF WBC: CPT

## 2024-04-01 PROCEDURE — 36415 COLL VENOUS BLD VENIPUNCTURE: CPT

## 2024-04-01 PROCEDURE — 700102 HCHG RX REV CODE 250 W/ 637 OVERRIDE(OP): Performed by: ORTHOPAEDIC SURGERY

## 2024-04-01 PROCEDURE — 30233N1 TRANSFUSION OF NONAUTOLOGOUS RED BLOOD CELLS INTO PERIPHERAL VEIN, PERCUTANEOUS APPROACH: ICD-10-PCS | Performed by: HOSPITALIST

## 2024-04-01 PROCEDURE — A9270 NON-COVERED ITEM OR SERVICE: HCPCS

## 2024-04-01 RX ORDER — SODIUM CHLORIDE 9 MG/ML
INJECTION, SOLUTION INTRAVENOUS CONTINUOUS
Status: DISCONTINUED | OUTPATIENT
Start: 2024-04-01 | End: 2024-04-02

## 2024-04-01 RX ADMIN — ONDANSETRON 4 MG: 4 TABLET, ORALLY DISINTEGRATING ORAL at 23:01

## 2024-04-01 RX ADMIN — CARVEDILOL 3.12 MG: 3.12 TABLET, FILM COATED ORAL at 16:27

## 2024-04-01 RX ADMIN — LEVOTHYROXINE SODIUM 175 MCG: 0.17 TABLET ORAL at 05:26

## 2024-04-01 RX ADMIN — LORATADINE 10 MG: 10 TABLET ORAL at 05:27

## 2024-04-01 RX ADMIN — ONDANSETRON 4 MG: 4 TABLET, ORALLY DISINTEGRATING ORAL at 17:48

## 2024-04-01 RX ADMIN — DOCUSATE SODIUM 100 MG: 100 CAPSULE, LIQUID FILLED ORAL at 05:27

## 2024-04-01 RX ADMIN — ATORVASTATIN CALCIUM 40 MG: 40 TABLET, FILM COATED ORAL at 21:36

## 2024-04-01 RX ADMIN — LISINOPRIL 5 MG: 5 TABLET ORAL at 05:27

## 2024-04-01 RX ADMIN — DOCUSATE SODIUM 50 MG AND SENNOSIDES 8.6 MG 1 TABLET: 8.6; 5 TABLET, FILM COATED ORAL at 21:36

## 2024-04-01 RX ADMIN — DOCUSATE SODIUM 100 MG: 100 CAPSULE, LIQUID FILLED ORAL at 16:27

## 2024-04-01 RX ADMIN — ACETAMINOPHEN 500 MG: 500 TABLET, FILM COATED ORAL at 00:16

## 2024-04-01 RX ADMIN — CARVEDILOL 3.12 MG: 3.12 TABLET, FILM COATED ORAL at 08:43

## 2024-04-01 RX ADMIN — ONDANSETRON 4 MG: 4 TABLET, ORALLY DISINTEGRATING ORAL at 02:59

## 2024-04-01 ASSESSMENT — ENCOUNTER SYMPTOMS
DIZZINESS: 0
CHILLS: 0
PALPITATIONS: 0
DEPRESSION: 0
FEVER: 0
NEUROLOGICAL NEGATIVE: 1
PSYCHIATRIC NEGATIVE: 1
VOMITING: 0
NAUSEA: 1
FOCAL WEAKNESS: 0
WEAKNESS: 0
MYALGIAS: 0
CONSTITUTIONAL NEGATIVE: 1
BRUISES/BLEEDS EASILY: 0
ABDOMINAL PAIN: 0
DIAPHORESIS: 0
HEADACHES: 0
SORE THROAT: 0
BLURRED VISION: 0
SHORTNESS OF BREATH: 0
CARDIOVASCULAR NEGATIVE: 1
COUGH: 0
FALLS: 1
WEIGHT LOSS: 0
RESPIRATORY NEGATIVE: 1
EYES NEGATIVE: 1

## 2024-04-01 ASSESSMENT — LIFESTYLE VARIABLES: SUBSTANCE_ABUSE: 0

## 2024-04-01 ASSESSMENT — PAIN DESCRIPTION - PAIN TYPE
TYPE: ACUTE PAIN

## 2024-04-01 NOTE — THERAPY
Occupational Therapy Contact Note    Patient Name: aJqui Sifuentes  Age:  88 y.o., Sex:  female  Medical Record #: [id number]  Today's Date: 4/1/2024    Attempted to see pt for OT session. Pt hgb 5.7 and pending blood transfusion. Will hold and reattempt as appropriate/able once pt hgb is within therapeutic range.

## 2024-04-01 NOTE — PROGRESS NOTES
"Hospital Medicine Daily Progress Note    Date of Service  4/1/2024    Chief Complaint  Jaqui Sifuentes is a 88 y.o. female admitted 3/27/2024 with leg pain after a mechanical fall    Hospital Course  Jaqui Sifuentes is a 88 y.o. female who presented to [facility] after a ground level fall on 3/27/2024. She has a past medical history of COPD, chronic hypoxic respiratory failure on 2 L oxygen, A-fib, hypothyroidism,and  mild aortic stenosis. She was walking down stairs when she had a misstep and collapsed.  She did hit her head but denied losing consciousness.  She denied lightheadedness or dizziness.  She deniesd any nausea, vomiting, diarrhea, shortness of breath, back pain, fevers.  Her only other complaint was left hand pain.  The patient does take Pradaxa and last took it 3/27 morning.     X-ray of the femur found communicated displaced distal femoral metaphyseal fracture  CT scan of the head was negative  EKG found to be controlled A-fib. She underwent an ORIF of the Left femur on 3/28/24 with Dr. Suero and tolerated this well. She has had limited pain. She did develop perioperative nausea and vomiting and pain medications were likely contributing to this. The emesis led to acute dehydration and acute renal failure so her chronic diuretics were held - this is resolving.    Her chronic anemia also worsened, prompting holding of her chronic anticoagulation and need for a prbc transfusion.    Interval Problem Update  Axox3,daughter at bedside. No further emesis, she has mild nausea, it has greatly improved, she denies chest pain, denies abdominal pain and denies leg pain. She had a small bm last night, no melena or hematochezia. She reports subacute anemia that \"has slowly been getting worse\", her pcp has been following this as an outpatient. Surgical site oozed blood this am and hbg came back at 5.7. Risks and beneftis of transfusion were discussed. Following closely with serial h/h. Vitals and exam " otherwise negative. ROS otherwise negative.     I have discussed this patient's plan of care and discharge plan at IDT rounds today with Case Management, Nursing, Nursing leadership, and other members of the IDT team.    Consultants/Specialty  [place]    Code Status  DNAR/DNI    Disposition  The patient is not medically cleared for discharge to home or a post-acute facility.      I have placed the appropriate orders for post-discharge needs.    Review of Systems  Review of Systems   Constitutional: Negative.  Negative for chills, diaphoresis, fever, malaise/fatigue and weight loss.   HENT: Negative.  Negative for sore throat.    Eyes: Negative.  Negative for blurred vision.   Respiratory: Negative.  Negative for cough and shortness of breath.    Cardiovascular: Negative.  Negative for chest pain, palpitations and leg swelling.   Gastrointestinal:  Positive for nausea. Negative for abdominal pain and vomiting.   Genitourinary: Negative.  Negative for dysuria.   Musculoskeletal:  Positive for falls. Negative for joint pain and myalgias.   Skin: Negative.  Negative for itching and rash.   Neurological: Negative.  Negative for dizziness, focal weakness, weakness and headaches.   Endo/Heme/Allergies: Negative.  Does not bruise/bleed easily.   Psychiatric/Behavioral: Negative.  Negative for depression, substance abuse and suicidal ideas.    All other systems reviewed and are negative.       Physical Exam  Temp:  [36.1 °C (97 °F)-36.7 °C (98 °F)] 36.1 °C (97 °F)  Pulse:  [60-97] 78  Resp:  [16-18] 16  BP: (108-134)/(39-57) 108/39  SpO2:  [90 %-99 %] 98 %    Physical Exam  Vitals and nursing note reviewed. Exam conducted with a chaperone present.   Constitutional:       General: She is not in acute distress.     Appearance: Normal appearance. She is not diaphoretic.   HENT:      Head: Normocephalic.      Nose: Nose normal.      Mouth/Throat:      Mouth: Mucous membranes are moist.   Eyes:      Pupils: Pupils are equal,  round, and reactive to light.   Cardiovascular:      Rate and Rhythm: Normal rate and regular rhythm.      Pulses: Normal pulses.      Heart sounds: Normal heart sounds.   Pulmonary:      Effort: Pulmonary effort is normal.      Breath sounds: Normal breath sounds.   Abdominal:      General: Abdomen is flat. Bowel sounds are normal.      Palpations: Abdomen is soft.   Musculoskeletal:         General: No swelling or deformity. Normal range of motion.      Comments: L leg surgical bandages in place, leg bandages changed after bleeding at surgical site, now cdi, following   Skin:     General: Skin is warm and dry.      Capillary Refill: Capillary refill takes less than 2 seconds.   Neurological:      General: No focal deficit present.      Mental Status: She is alert and oriented to person, place, and time.      Cranial Nerves: No cranial nerve deficit.   Psychiatric:         Mood and Affect: Mood normal.         Behavior: Behavior normal.         Fluids    Intake/Output Summary (Last 24 hours) at 4/1/2024 1343  Last data filed at 4/1/2024 1000  Gross per 24 hour   Intake 1280 ml   Output 2000 ml   Net -720 ml       Laboratory  Recent Labs     03/30/24  0521 03/31/24  1007 04/01/24  1131   WBC 19.0* 15.9* 12.3*   RBC 2.82* 2.18* 1.80*   HEMOGLOBIN 8.9* 7.1* 5.7*   HEMATOCRIT 26.5* 20.3* 17.4*   MCV 94.0 93.1 96.7   MCH 31.6 32.6 31.7   MCHC 33.6 35.0 32.8   RDW 48.8 46.5 48.9   PLATELETCT 163* 124* 171   MPV 11.0 10.8 10.4     Recent Labs     03/30/24  0521 03/31/24  0527 04/01/24  0857   SODIUM 136 134* 134*   POTASSIUM 4.8 4.9 4.9   CHLORIDE 99 102 103   CO2 24 23 24   GLUCOSE 133* 100* 105*   BUN 50* 51* 42*   CREATININE 1.88* 1.41* 0.95   CALCIUM 9.6 8.9 9.1                     Imaging  [id number] VIEW   Final Result      Nonobstructive bowel gas pattern.      DX-PORTABLE FLUOROSCOPY < 1 HOUR   Final Result      Portable fluoroscopy utilized for 1 minute 20 seconds.         INTERPRETING LOCATION: [address] [place] [address]  [place], [id number]      DX-FEMUR-2+ LEFT   Final Result      Digitized intraoperative radiograph is submitted for review. This examination is not for diagnostic purpose but for guidance during a surgical procedure. Please see the patient's chart for full procedural details.         INTERPRETING LOCATION: [address], [place], [address]      DX-KNEE 2- LEFT   Final Result         1.  Comminuted displaced distal femoral metaphyseal fracture      CT-HEAD W/O   Final Result         1.  No acute intracranial abnormality is identified, there are nonspecific white matter changes, commonly associated with small vessel ischemic disease.  Associated mild cerebral atrophy is noted.   2.  Atherosclerosis.              Assessment/Plan  * Closed fracture of distal end of femur (HCC)- (present on admission)  Assessment & Plan  Pain control with oral and IV narcotics  S/p surgical repair with Dr Suero 3/28/24  DVT prophylaxis after surgery  PT OT  Pain control and supportive care    Anemia  Assessment & Plan  H/h continues to decrease no melena or hematochezia  This is acute on chronic, see above  Will check iron studies and retic count  Transfuse prbc and serial h/h  The blood loss is multifactorial, due to multiple blood draws, expected perioperative blood loss, and hemodilution  Will stop lovenox  Continue to follow  Serial h/h      Thrombocytopenia (HCC)  Assessment & Plan  Mild  Decreasing slowly  Cbc in am    Leukocytosis  Assessment & Plan  Clinically improved  Likely stress reaction from emesis and surgery  Much improved  Will continue to follow  viral uri panel negative, could have viral gastroenteritis that is contributing  B/c so far negative, following  Cbc in am    Hyponatremia  Assessment & Plan  Likely due to dehydration  resolved    Nausea and vomiting  Assessment & Plan  Contributing to dehydration and therefore acute renal failure  Addressing  Suspect pain meds contributed, now resolving  KUB showed no acute  abnormalities  No associated pain  Following  Supportive care    Acute renal failure (ARF) ([facility])  Assessment & Plan  Clinically due to dehydration  Emesis was contributing - now resolved  Now resolving  Continue to hold diuretics with worsening anemia,  may resume tomorrow stable and  if indicated  Serial cbc and bmp in am  Avoid nephrotoxins  No evidence of retention  Renal dose meds  Cpk minimally elevated, will repeat in am    COPD (chronic obstructive pulmonary disease) ([facility])  Assessment & Plan  Not in exacerbation  Continue home inhalers    Encounter for preoperative assessment  Assessment & Plan  Admit to:    Orthopedic/Med-Surg floor since no major co-morbidities.     Cardiovascular:   Patient has history of CHF: Continue home beta blocker and rate control medications.   Pre-op EKG: Yes    Pulmonary:  Oxygen per protocol  Incentive Spirometer    GI:   No history of cirrhosis. Standard bowel regimen. Hold for loose stools.    Renal:   IV fluids: No fluids - risk of fluid overload    Labs: Metabolic Panel every 6 hours for 24 hours for significant electrolyte derangements    Musculoskeletal:   Check 25 OH vitamin D level. If 31-40 pg/mL, consider starting vitamin D3 1000 IU PO daily. If 20-30 pg/mL, consider vitamin D3 2000 IU PO daily. If <20 pg/mL, vitamin D2 50,000 IU weekly x 8 weeks then 2000 IU PO daily.    Neurologic:   Pain Control: Neuro checks every 4 hours  Avoid fentanyl (short-acting)    Hematologic:  Plan on pharmacologic DVT prophylaxis post operative day #1. Hold for decreasing hemoglobin. Notify provider for hemoglobin less than 8.  Order preoperative type and cross.   Hgb every 6 hours if high bleeding risk.   If patient was on anticoagulation prior to arrival risks and benefits will be weighed by teams including surgery, hospitalist, geriatrics, and anesthesia for delaying surgery more than 24 hours.   On anticoagulation prior to arrival: Yes: Significant preoperative anemia or INR greater  than 1.5, draw hgb/hct every 4 hours for 24 hours, INR daily for 2 days.     Medical Assessment Risk:  High    Surgical Risk:   Intermediate      Hypertension  Assessment & Plan  Decreasing home bp meds due to mild hypotension, see above, following closely and adjusting as needed  Holding diuretics, see above    Aortic stenosis  Assessment & Plan  Mild that was found on echo in 2022    Chronic hypoxic respiratory failure (HCC)  Assessment & Plan  Currently stable on 1L which is below her baseline of 2L  No evidence of acute exacerbation    Atrial fibrillation (HCC)  Assessment & Plan  Rate controlled  Continue Coreg but decrease dose due to mild hypotension, following closely and adjusting dose as needed  Holding pradaxa due to anemia, see above, following      Fall  Assessment & Plan  Patient denies any other symptoms including back pain headache  Reports it was purely mechanical  PT and OT           VTE prophylaxis: pradaxa held for surgery, renal dose lovenox for now    I have performed a physical exam and reviewed and updated ROS and Plan today (4/1/2024). In review of yesterday's note (3/31/2024), there are no changes except as documented above.

## 2024-04-01 NOTE — DISCHARGE PLANNING
[facility] Transitional Care Coordination    Rehab following.  Awaiting clarification of [employer] Medicare HMO benefits - specifically, any OON benefit for acute rehab RR.      1151 - Per [facility] PAR - patient's [employer] Medicare HMO is out of network for [facility], no OON benefit.  Please consider referral to [facility] acute rehab to assist with discharge planning.  Volate update to S6 ROGER Ambrocio.  TCC signing off.      Thank you for the referral.

## 2024-04-01 NOTE — THERAPY
Physical Therapy Contact Note    Patient Name: Jaqui Sifuentes  Age:  88 y.o., Sex:  female  Medical Record #: [id number]  Today's Date: 4/1/2024 04/01/24 1314   Treatment Variance   Reason For Missed Therapy Medical - Patient on Hold from Therapy   Other Treatments   Other Treatments Provided Pt on hold from PT services 2* low H/H: 5.7/17.4. PT will resume tomorrow once transfused   Interdisciplinary Plan of Care Collaboration   IDT Collaboration with  Nursing   Session Information   Date / Session Number  3/29--1 (1/5, 4/4) Hold PT 4/1   Supervising Physical Therapist (PTA Treatments Only)   Supervising Physical Therapist Israel Santoro

## 2024-04-01 NOTE — CARE PLAN
Pt ao x 4, pleasant and cooperative.  Pt meds passed per MAR, no injuries this shift.  Call light in reach, bed at lowest position, no needs at this time    The patient is Stable - Low risk of patient condition declining or worsening    Shift Goals  Clinical Goals: N/V mgmt  Patient Goals: sleep, comfort  Family Goals: DERREK    Progress made toward(s) clinical / shift goals:  pt meds passed per MAR, no injuries this shift      Problem: Knowledge Deficit - Standard  Goal: Patient and family/care givers will demonstrate understanding of plan of care, disease process/condition, diagnostic tests and medications  Outcome: Progressing     Problem: Skin Integrity  Goal: Skin integrity is maintained or improved  Outcome: Progressing     Problem: Pain - Standard  Goal: Alleviation of pain or a reduction in pain to the patient’s comfort goal  Outcome: Progressing       Patient is not progressing towards the following goals:      Problem: Fall Risk  Goal: Patient will remain free from falls  Outcome: Not Met     Problem: Nutrition  Goal: Patient's nutritional and fluid intake will be adequate or improve  Outcome: Not Met

## 2024-04-01 NOTE — PROGRESS NOTES
"      Orthopaedic Progress Note    Interval changes:  Patient doing well   LLE in knee immobilizer, dressings changed, incisions without issues  Cleared for DC to rehab by ortho pending medicine clearance    ROS - Patient denies any new issues.  Pain well controlled.    /39   Pulse 78   Temp 36.1 °C (97 °F)   Resp 16   Ht 1.651 m (5' 5\")   Wt 111 kg (243 lb 13.3 oz)   SpO2 98%     Patient seen and examined  No acute distress  Breathing non labored  RRR  LLE in knee immobilizer, dressings changed, incisions without issues, DNVI, moves all toes, cap refill <2 sec.     Recent Labs     03/30/24  0521 03/31/24  1007 04/01/24  1131   WBC 19.0* 15.9* 12.3*   RBC 2.82* 2.18* 1.80*   HEMOGLOBIN 8.9* 7.1* 5.7*   HEMATOCRIT 26.5* 20.3* 17.4*   MCV 94.0 93.1 96.7   MCH 31.6 32.6 31.7   MCHC 33.6 35.0 32.8   RDW 48.8 46.5 48.9   PLATELETCT 163* 124* 171   MPV 11.0 10.8 10.4       Active Hospital Problems    Diagnosis     Anemia [D64.9]     Leukocytosis [D72.829]     Thrombocytopenia ([facility]) [D69.6]     Acute renal failure (ARF) ([facility]) [N17.9]     Nausea and vomiting [R11.2]     Hyponatremia [E87.1]     Fall [W19.XXXA]     Atrial fibrillation ([facility]) [I48.91]     Chronic hypoxic respiratory failure ([facility]) [J96.11]     Aortic stenosis [I35.0]     Hypertension [I10]     Encounter for preoperative assessment [Z01.818]     COPD (chronic obstructive pulmonary disease) ([facility]) [J44.9]     Closed fracture of distal end of femur ([facility]) [S72.409A]        Assessment/Plan:  Patient doing well   LLE in knee immobilizer, dressings changed, incisions without issues  Cleared for DC to rehab by ortho pending medicine clearance  POD#4 S/P Open treatment of left femoral supracondylar fracture without intracondylar extension   Wt bearing status - TTWB   Wound care/Drains - Dressings to be changed every other day by nursing. Or PRN for saturation starting POD#2  Future Procedures - none planned   Lovenox: Start 3/29, Duration-until ambulatory > " 150'  Sutures/Staples out- 14-21 days post operatively. Removal will completed by ortho mid levels only.  PT/OT-initiated  Antibiotics: Perioperative completed  DVT Prophylaxis- TEDS/SCDs/Foot pumps  Newman-not needed per ortho  Case Coordination for Discharge Planning - Disposition per therapy recs.

## 2024-04-01 NOTE — DISCHARGE PLANNING
Case Management Discharge Planning    Admission Date: 3/27/2024  GMLOS: 4.5  ALOS: 5    6-Clicks ADL Score: 17  6-Clicks Mobility Score: 12  PT and/or OT Eval ordered: Yes  Post-acute Referrals Ordered: Yes  Post-acute Choice Obtained: Yes  Has referral(s) been sent to post-acute provider:  Yes      Anticipated Discharge Dispo: Discharge Disposition: Disch to IP rehab facility or distinct part unit (62)    DME Needed: No    Action(s) Taken: Referral(s) sent    This RN CM updated family and patient at bedside that [facility]  rehab is out of network and will not be able to accept patient case. Discussed plan for sending referral to [facility] and [facility]. Family member is open to assisting with transport back to [place] with wheelchair accessible van.     Per family and patient they are not interested in perusing choice for SNF only Rehab.    Sent referrals and will follow up with facilities on acceptance and bed availability.    Manually faxed referral to [facility] at [phone].    Escalations Completed: Pending Discharge Destination    Medically Clear: No    Next Steps: Follow up with Rehab.    Barriers to Discharge: Pending Placement    Is the patient up for discharge tomorrow: No

## 2024-04-01 NOTE — CARE PLAN
The patient is Stable - Low risk of patient condition declining or worsening    Shift Goals  Clinical Goals: Pt will have pain and nausea managed throughout shift  Patient Goals: sleep, comfort  Family Goals: DERREK    Progress made toward(s) clinical / shift goals:  Pt complained of nausea, medicated per MAR, reported that pain decreased with Tylenol administration. Poor oral intake also noted, reported loss of appetite. Patient call light when in need of assistance, safety precautions in placed.     Patient is not progressing towards the following goals:      Problem: Pain - Standard  Goal: Alleviation of pain or a reduction in pain to the patient’s comfort goal  Description: Target End Date:  Prior to discharge or change in level of care    Document on Vitals flowsheet    1.  Document pain using the appropriate pain scale per order or unit policy  2.  Educate and implement non-pharmacologic comfort measures (i.e. relaxation, distraction, massage, cold/heat therapy, etc.)  3.  Pain management medications as ordered  4.  Reassess pain after pain med administration per policy  5.  If opiods administered assess patient's response to pain medication is appropriate per POSS sedation scale  6.  Follow pain management plan developed in collaboration with patient and interdisciplinary team (including palliative care or pain specialists if applicable)  Outcome: Not Progressing     Problem: Nutrition  Goal: Patient's nutritional and fluid intake will be adequate or improve  Description: Target End Date:  Prior to discharge or change in level of care    Document on I/O flowsheet    1.  Monitor nutritional intake  2.  Monitor weight per provider order  3.  Assess patient's ability to take oral nutrition  4.  Collaborate with Speech Therapy, Dietitian and interdisciplinary team for appropriate feeding and fluid intake  5.  Assist with feeding  Outcome: Not Progressing

## 2024-04-02 LAB
ANION GAP SERPL CALC-SCNC: 6 MMOL/L (ref 7–16)
BASOPHILS # BLD AUTO: 0.1 % (ref 0–1.8)
BASOPHILS # BLD: 0.01 K/UL (ref 0–0.12)
BUN SERPL-MCNC: 36 MG/DL (ref 8–22)
CALCIUM SERPL-MCNC: 9.2 MG/DL (ref 8.5–10.5)
CHLORIDE SERPL-SCNC: 103 MMOL/L (ref 96–112)
CK SERPL-CCNC: 157 U/L (ref 0–154)
CO2 SERPL-SCNC: 26 MMOL/L (ref 20–33)
CREAT SERPL-MCNC: 0.89 MG/DL (ref 0.5–1.4)
EOSINOPHIL # BLD AUTO: 0.05 K/UL (ref 0–0.51)
EOSINOPHIL NFR BLD: 0.4 % (ref 0–6.9)
ERYTHROCYTE [DISTWIDTH] IN BLOOD BY AUTOMATED COUNT: 51.8 FL (ref 35.9–50)
GFR SERPLBLD CREATININE-BSD FMLA CKD-EPI: 63 ML/MIN/1.73 M 2
GLUCOSE SERPL-MCNC: 118 MG/DL (ref 65–99)
HCT VFR BLD AUTO: 23.9 % (ref 37–47)
HGB BLD-MCNC: 7.5 G/DL (ref 12–16)
HGB BLD-MCNC: 7.9 G/DL (ref 12–16)
HGB BLD-MCNC: 8 G/DL (ref 12–16)
HGB BLD-MCNC: 8.1 G/DL (ref 12–16)
IMM GRANULOCYTES # BLD AUTO: 0.13 K/UL (ref 0–0.11)
IMM GRANULOCYTES NFR BLD AUTO: 1 % (ref 0–0.9)
LYMPHOCYTES # BLD AUTO: 1.08 K/UL (ref 1–4.8)
LYMPHOCYTES NFR BLD: 8 % (ref 22–41)
MCH RBC QN AUTO: 30.6 PG (ref 27–33)
MCHC RBC AUTO-ENTMCNC: 33.9 G/DL (ref 32.2–35.5)
MCV RBC AUTO: 90.2 FL (ref 81.4–97.8)
MONOCYTES # BLD AUTO: 1.14 K/UL (ref 0–0.85)
MONOCYTES NFR BLD AUTO: 8.4 % (ref 0–13.4)
NEUTROPHILS # BLD AUTO: 11.15 K/UL (ref 1.82–7.42)
NEUTROPHILS NFR BLD: 82.1 % (ref 44–72)
NRBC # BLD AUTO: 0 K/UL
NRBC BLD-RTO: 0 /100 WBC (ref 0–0.2)
PLATELET # BLD AUTO: 177 K/UL (ref 164–446)
PMV BLD AUTO: 10.3 FL (ref 9–12.9)
POTASSIUM SERPL-SCNC: 5.1 MMOL/L (ref 3.6–5.5)
RBC # BLD AUTO: 2.65 M/UL (ref 4.2–5.4)
SODIUM SERPL-SCNC: 135 MMOL/L (ref 135–145)
WBC # BLD AUTO: 13.6 K/UL (ref 4.8–10.8)

## 2024-04-02 PROCEDURE — 85018 HEMOGLOBIN: CPT | Mod: 91

## 2024-04-02 PROCEDURE — 80048 BASIC METABOLIC PNL TOTAL CA: CPT

## 2024-04-02 PROCEDURE — C9113 INJ PANTOPRAZOLE SODIUM, VIA: HCPCS | Performed by: INTERNAL MEDICINE

## 2024-04-02 PROCEDURE — 700105 HCHG RX REV CODE 258: Performed by: INTERNAL MEDICINE

## 2024-04-02 PROCEDURE — A9270 NON-COVERED ITEM OR SERVICE: HCPCS | Performed by: HOSPITALIST

## 2024-04-02 PROCEDURE — 770006 HCHG ROOM/CARE - MED/SURG/GYN SEMI*

## 2024-04-02 PROCEDURE — 700102 HCHG RX REV CODE 250 W/ 637 OVERRIDE(OP): Performed by: HOSPITALIST

## 2024-04-02 PROCEDURE — A9270 NON-COVERED ITEM OR SERVICE: HCPCS | Performed by: ORTHOPAEDIC SURGERY

## 2024-04-02 PROCEDURE — 700111 HCHG RX REV CODE 636 W/ 250 OVERRIDE (IP): Performed by: INTERNAL MEDICINE

## 2024-04-02 PROCEDURE — 99233 SBSQ HOSP IP/OBS HIGH 50: CPT | Performed by: INTERNAL MEDICINE

## 2024-04-02 PROCEDURE — 85025 COMPLETE CBC W/AUTO DIFF WBC: CPT

## 2024-04-02 PROCEDURE — 99222 1ST HOSP IP/OBS MODERATE 55: CPT | Performed by: INTERNAL MEDICINE

## 2024-04-02 PROCEDURE — 82550 ASSAY OF CK (CPK): CPT

## 2024-04-02 PROCEDURE — 36415 COLL VENOUS BLD VENIPUNCTURE: CPT

## 2024-04-02 PROCEDURE — 700111 HCHG RX REV CODE 636 W/ 250 OVERRIDE (IP): Mod: JZ | Performed by: ORTHOPAEDIC SURGERY

## 2024-04-02 PROCEDURE — 700105 HCHG RX REV CODE 258: Performed by: NURSE PRACTITIONER

## 2024-04-02 PROCEDURE — 700102 HCHG RX REV CODE 250 W/ 637 OVERRIDE(OP): Performed by: ORTHOPAEDIC SURGERY

## 2024-04-02 RX ORDER — PANTOPRAZOLE SODIUM 40 MG/10ML
40 INJECTION, POWDER, LYOPHILIZED, FOR SOLUTION INTRAVENOUS 2 TIMES DAILY
Status: DISCONTINUED | OUTPATIENT
Start: 2024-04-02 | End: 2024-04-03

## 2024-04-02 RX ORDER — SODIUM CHLORIDE 9 MG/ML
INJECTION, SOLUTION INTRAVENOUS CONTINUOUS
Status: ACTIVE | OUTPATIENT
Start: 2024-04-02 | End: 2024-04-03

## 2024-04-02 RX ADMIN — LEVOTHYROXINE SODIUM 175 MCG: 0.17 TABLET ORAL at 05:59

## 2024-04-02 RX ADMIN — PANTOPRAZOLE SODIUM 40 MG: 40 INJECTION, POWDER, LYOPHILIZED, FOR SOLUTION INTRAVENOUS at 17:22

## 2024-04-02 RX ADMIN — ATORVASTATIN CALCIUM 40 MG: 40 TABLET, FILM COATED ORAL at 20:14

## 2024-04-02 RX ADMIN — ONDANSETRON 4 MG: 2 INJECTION INTRAMUSCULAR; INTRAVENOUS at 12:45

## 2024-04-02 RX ADMIN — ONDANSETRON 4 MG: 2 INJECTION INTRAMUSCULAR; INTRAVENOUS at 06:06

## 2024-04-02 RX ADMIN — DOCUSATE SODIUM 100 MG: 100 CAPSULE, LIQUID FILLED ORAL at 17:22

## 2024-04-02 RX ADMIN — LISINOPRIL 5 MG: 5 TABLET ORAL at 06:02

## 2024-04-02 RX ADMIN — DOCUSATE SODIUM 100 MG: 100 CAPSULE, LIQUID FILLED ORAL at 06:03

## 2024-04-02 RX ADMIN — DOCUSATE SODIUM 50 MG AND SENNOSIDES 8.6 MG 1 TABLET: 8.6; 5 TABLET, FILM COATED ORAL at 20:14

## 2024-04-02 RX ADMIN — SODIUM CHLORIDE: 9 INJECTION, SOLUTION INTRAVENOUS at 13:30

## 2024-04-02 RX ADMIN — LORATADINE 10 MG: 10 TABLET ORAL at 06:03

## 2024-04-02 RX ADMIN — CARVEDILOL 3.12 MG: 3.12 TABLET, FILM COATED ORAL at 09:21

## 2024-04-02 RX ADMIN — CARVEDILOL 3.12 MG: 3.12 TABLET, FILM COATED ORAL at 17:22

## 2024-04-02 RX ADMIN — SODIUM CHLORIDE: 9 INJECTION, SOLUTION INTRAVENOUS at 00:27

## 2024-04-02 ASSESSMENT — ENCOUNTER SYMPTOMS
FEVER: 0
MUSCULOSKELETAL NEGATIVE: 1
ABDOMINAL PAIN: 1
WEAKNESS: 1
HEADACHES: 0
SHORTNESS OF BREATH: 0
HEARTBURN: 1
CHILLS: 0
NEUROLOGICAL NEGATIVE: 1
VOMITING: 1
CONSTITUTIONAL NEGATIVE: 1
MYALGIAS: 0
CARDIOVASCULAR NEGATIVE: 1
FOCAL WEAKNESS: 0
DEPRESSION: 0
FALLS: 1
NAUSEA: 1
ABDOMINAL PAIN: 0
RESPIRATORY NEGATIVE: 1
PSYCHIATRIC NEGATIVE: 1
BLOOD IN STOOL: 0
DIZZINESS: 0
EYES NEGATIVE: 1
WEIGHT LOSS: 0

## 2024-04-02 ASSESSMENT — PAIN DESCRIPTION - PAIN TYPE: TYPE: ACUTE PAIN

## 2024-04-02 NOTE — PROGRESS NOTES
Patient had coffee ground emesis of about 200 mL. Denied dizziness, chest pain or SOB. Reduced blood transfusion rate from 120 cc/hr to 100 cc/hr. Vital signs were stable. Hospitalist on call notified.

## 2024-04-02 NOTE — PROGRESS NOTES
HOSPITALIST NOC CROSS COVER    Patient with hemoglobin 5.7 today, received 1 unit packed red blood cells. Repeat hemoglobin is 6.8. Patient is asymptomatic. Vital signs stable    -Transfuse second unit packed red blood cells  -Repeat hemoglobin posttransfusion

## 2024-04-02 NOTE — DISCHARGE PLANNING
Case Management Discharge Planning    Admission Date: 3/27/2024  GMLOS: 4.5  ALOS: 6    6-Clicks ADL Score: 17  6-Clicks Mobility Score: 12  PT and/or OT Eval ordered: Yes  Post-acute Referrals Ordered: Yes  Post-acute Choice Obtained: Yes  Has referral(s) been sent to post-acute provider:  Yes      Anticipated Discharge Dispo: Discharge Disposition: Disch to  rehab facility or distinct part unit (62)    DME Needed: No    Action(s) Taken: LMSW faxed updated labs to [facility] at [phone].    Addendum @ 6532: [facility] submitted for insurance auth. Once received, the auth will sustain until April 11th. LMSW to send updated labs on the pt this afternoon.     Addendum @ 0581: LMSW updated by [facility]. Insurance approved pt's authorization starting 04/04/24. LMSW to send updated clinicals on 04/03/24.    Escalations Completed: None    Medically Clear: No    Next Steps: LMSW to follow for DC.     Barriers to Discharge: Pending Placement    Is the patient up for discharge tomorrow: No

## 2024-04-02 NOTE — CARE PLAN
Pt ao x 4, pleasant and cooperative.  Pt meds passed per MAR, no injuries this shift, mobilized to edge of bed every meal and mobilized to standing during linen changes.  Call light in reach, bed at lowest position, no needs at this time.    The patient is Stable - Low risk of patient condition declining or worsening    Shift Goals  Clinical Goals: N/V mgmt, stable labs through this shift  Patient Goals: N/V mgmt  Family Goals: DERREK    Progress made toward(s) clinical / shift goals:  pt meds passed per MAR, no injuries this shift      Problem: Knowledge Deficit - Standard  Goal: Patient and family/care givers will demonstrate understanding of plan of care, disease process/condition, diagnostic tests and medications  Outcome: Progressing     Problem: Skin Integrity  Goal: Skin integrity is maintained or improved  Outcome: Progressing       Patient is not progressing towards the following goals: pt mobilized for all meals to edge of bed this shift, mobilized to stand during linen changes      Problem: Fall Risk  Goal: Patient will remain free from falls  Outcome: Not Met     Problem: Nutrition  Goal: Patient's nutritional and fluid intake will be adequate or improve  Outcome: Not Met     Problem: Risk for Bleeding  Goal: Patient will not experience bleeding as evidenced by normal blood pressure, stable hematocrit and hemoglobin levels and desired ranges for coagulation profiles  Outcome: Not Met

## 2024-04-02 NOTE — PROGRESS NOTES
[facility] Medicine Daily Progress Note    Date of Service  4/2/2024    Chief Complaint  Jaqui Sifuentes is a 88 y.o. female admitted 3/27/2024 with leg pain after a mechanical fall    Hospital Course  Jaqui Sifuentes is a 88 y.o. female who presented to [facility] after a ground level fall on 3/27/2024. She has a past medical history of COPD, chronic hypoxic respiratory failure on 2 L oxygen, A-fib, hypothyroidism,and  mild aortic stenosis. She was walking down stairs when she had a misstep and collapsed.  She did hit her head but denied losing consciousness.  She denied lightheadedness or dizziness.  She deniesd any nausea, vomiting, diarrhea, shortness of breath, back pain, fevers.  Her only other complaint was left hand pain.  The patient does take Pradaxa and last took it 3/27 morning.     X-ray of the femur found communicated displaced distal femoral metaphyseal fracture  CT scan of the head was negative  EKG found to be controlled A-fib. She underwent an ORIF of the Left femur on 3/28/24 with Dr. Suero and tolerated this well. She has had limited pain. She did develop perioperative nausea and vomiting and pain medications were likely contributing to this. The emesis led to acute dehydration and acute renal failure so her chronic diuretics were held - this is resolving.    Her chronic anemia also worsened, prompting holding of her chronic anticoagulation and need for a prbc transfusion.    Interval Problem Update  4/2  Patient continues to have nausea and vomiting, had some coffee ground emesis overnight. Patient has ongoing epigastric pain and worsening GERD like symptoms. This has been worsening before the surgery with worsening anemia in the outpatient world. Had a COLO many years ago which is normal and denies any change to her bowel habits recently. Is passing some gas and had a small fecal smear yesterday. Surgical site feels fine without any pain. Family is concerned about getting her back to [place].  I personally spoke with Dr Beaver of GI about a possible EGD. Received 2 U PRBC's yesterday with appropriate response.     I have discussed this patient's plan of care and discharge plan at IDT rounds today with Case Management, Nursing, Nursing leadership, and other members of the IDT team.    Consultants/Specialty  [facility]  [facility]    Code Status  DNAR/DNI    Disposition  The patient is not medically cleared for discharge to home or a post-acute facility.  Anticipate discharge to: skilled nursing facility    I have placed the appropriate orders for post-discharge needs.    Review of Systems  Review of Systems   Constitutional: Negative.  Negative for chills, fever and weight loss.   Respiratory:  Negative for shortness of breath.    Cardiovascular:  Negative for chest pain and leg swelling.   Gastrointestinal:  Positive for nausea and vomiting. Negative for abdominal pain, blood in stool and melena.   Musculoskeletal:  Positive for falls. Negative for joint pain and myalgias.   Neurological:  Positive for weakness. Negative for dizziness, focal weakness and headaches.   Endo/Heme/Allergies: Negative.    Psychiatric/Behavioral: Negative.  Negative for depression.    All other systems reviewed and are negative.       Physical Exam  Temp:  [36 °C (96.8 °F)-36.9 °C (98.5 °F)] 36.8 °C (98.2 °F)  Pulse:  [78-95] 88  Resp:  [15-20] 18  BP: (100-147)/(40-91) 133/58  SpO2:  [95 %-100 %] 100 %    Physical Exam  Vitals and nursing note reviewed. Exam conducted with a chaperone present.   Constitutional:       General: She is not in acute distress.     Appearance: Normal appearance. She is not diaphoretic.      Comments: Body mass index is 40.58 kg/m².     HENT:      Head: Normocephalic.      Nose: Nose normal.      Mouth/Throat:      Mouth: Mucous membranes are moist.   Eyes:      Pupils: Pupils are equal, round, and reactive to light.   Cardiovascular:      Rate and Rhythm: Normal rate and regular rhythm.       Pulses: Normal pulses.      Heart sounds: Normal heart sounds.   Pulmonary:      Effort: Pulmonary effort is normal.      Breath sounds: Normal breath sounds.   Abdominal:      General: Abdomen is flat. Bowel sounds are normal. There is no distension.      Palpations: Abdomen is soft.      Tenderness: There is abdominal tenderness (mild to moderate in the epigastric region).   Musculoskeletal:         General: No swelling or deformity. Normal range of motion.      Right lower leg: No edema.      Left lower leg: No edema.      Comments: L leg surgical bandages in place, leg bandages changed after bleeding at surgical site, now cdi, remains unchanged on 4/2   Skin:     General: Skin is warm and dry.      Capillary Refill: Capillary refill takes less than 2 seconds.      Coloration: Skin is pale.   Neurological:      General: No focal deficit present.      Mental Status: She is alert and oriented to person, place, and time.      Cranial Nerves: No cranial nerve deficit.   Psychiatric:         Mood and Affect: Mood normal.         Behavior: Behavior normal.         Fluids    Intake/Output Summary (Last 24 hours) at 4/2/2024 1310  Last data filed at 4/2/2024 0603  Gross per 24 hour   Intake 850 ml   Output 1300 ml   Net -450 ml       Laboratory  Recent Labs     04/01/24  1131 04/01/24  1814 04/02/24  0237 04/02/24  0531   WBC 12.3* 12.5* 13.6*  --    RBC 1.80* 2.14* 2.65*  --    HEMOGLOBIN 5.7* 6.8*  6.8* 8.1* 7.9*   HEMATOCRIT 17.4* 20.0* 23.9*  --    MCV 96.7 93.5 90.2  --    MCH 31.7 32.2 30.6  --    MCHC 32.8 34.5 33.9  --    RDW 48.9 51.6* 51.8*  --    PLATELETCT 171 146* 177  --    MPV 10.4 11.3 10.3  --      Recent Labs     03/31/24  0527 04/01/24  0857 04/02/24  0237   SODIUM 134* 134* 135   POTASSIUM 4.9 4.9 5.1   CHLORIDE 102 103 103   CO2 23 24 26   GLUCOSE 100* 105* 118*   BUN 51* 42* 36*   CREATININE 1.41* 0.95 0.89   CALCIUM 8.9 9.1 9.2                     Imaging  [id number] VIEW   Final Result       Nonobstructive bowel gas pattern.      DX-PORTABLE FLUOROSCOPY < 1 HOUR   Final Result      Portable fluoroscopy utilized for 1 minute 20 seconds.         INTERPRETING LOCATION: [address], [place], [address]      DX-FEMUR-2+ LEFT   Final Result      Digitized intraoperative radiograph is submitted for review. This examination is not for diagnostic purpose but for guidance during a surgical procedure. Please see the patient's chart for full procedural details.         INTERPRETING LOCATION: [address], [place], [address]      DX-KNEE 2- LEFT   Final Result         1.  Comminuted displaced distal femoral metaphyseal fracture      CT-HEAD W/O   Final Result         1.  No acute intracranial abnormality is identified, there are nonspecific white matter changes, commonly associated with small vessel ischemic disease.  Associated mild cerebral atrophy is noted.   2.  Atherosclerosis.              Assessment/Plan  * Closed fracture of distal end of femur (HCC)- (present on admission)  Assessment & Plan  Pain control with oral and IV narcotics  S/p surgical repair with Dr Suero 3/28/24  SCD's  Monitor  As noted above  Ortho following    Anemia- (present on admission)  Assessment & Plan  Appears to have acute blood loss anemia at this time which is multi-factorial: an element of hemdilution, blood loss from orthopedic surgery and possible UGI source  S/P 2 U PRBC's on 4/1, ahs stabilized now  Daily CBC unless symptoms with conservative transfusion threshold  Consult GI for possible EGD, start IV protonix 40 mg BID  Monitor surgical site which appears ok at this time        Thrombocytopenia (HCC)- (present on admission)  Assessment & Plan  Improved, now normalized  I personally reviewed the CBC on 4/2    Leukocytosis- (present on admission)  Assessment & Plan  Remains about the same  Suspect stress reaction from surgery and possible UGIB  Monitor  Defer abx's for now    Hyponatremia- (present on admission)  Assessment &  Plan  Resolved  I personally reviewed the bmp on 4/2    Nausea and vomiting- (present on admission)  Assessment & Plan  Continues without much improvement and now with some element of coffee ground emesis  See below  Contributing to dehydration and therefore acute renal failure  Suspect pain meds contributed, now resolving  KUB showed no acute abnormalities      Acute renal failure (ARF) (HCC)- (present on admission)  Assessment & Plan  Resolved  Hold outpatient diuretics for this time  Gentle IVF's while NPO    COPD (chronic obstructive pulmonary disease) (HCC)- (present on admission)  Assessment & Plan  Not in exacerbation  Continue home inhalers    Encounter for preoperative assessment- (present on admission)  Assessment & Plan        Hypertension- (present on admission)  Assessment & Plan  BP improving  Continue lisinopril 5 mg daily, coreg 3.125 mg BID at dose reductions  Continue to hold MRA and lasix  Adjust prn, noted 4/2    Aortic stenosis- (present on admission)  Assessment & Plan  Mild that was found on echo in 2022    Chronic hypoxic respiratory failure (HCC)- (present on admission)  Assessment & Plan  Currently stable on 1L which is below her baseline of 2L  No evidence of acute exacerbation    Atrial fibrillation (HCC)- (present on admission)  Assessment & Plan  Rate controlled  Continue Coreg but decrease dose due to mild hypotension, following closely and adjusting dose as needed  Holding pradaxa due to anemia, see above, following  No changes planned 4/2      Fall- (present on admission)  Assessment & Plan  Patient denies any other symptoms including back pain headache  Reports it was purely mechanical  PT and OT--family requesting IPR in [place], referrals placed and CM following           VTE prophylaxis:  pradaxa and lovenox hld    I have performed a physical exam and reviewed and updated ROS and Plan today (4/2/2024). In review of yesterday's note (4/1/2024), there are no changes except as  documented above.          SCDs/TEDs

## 2024-04-02 NOTE — PROGRESS NOTES
"      Orthopaedic Progress Note    Interval changes:  Patient doing well   LLE in knee immobilizer, dressings changed, incisions without issues  Cleared for DC to rehab by ortho pending medicine clearance    ROS - Patient denies any new issues.  Pain well controlled.    /58   Pulse 88   Temp 36.8 °C (98.2 °F) (Temporal)   Resp 18   Ht 1.651 m (5' 5\")   Wt 111 kg (243 lb 13.3 oz)   SpO2 100%     Patient seen and examined  No acute distress  Breathing non labored  RRR  LLE in knee immobilizer, dressings changed, incisions without issues, DNVI, moves all toes, cap refill <2 sec.     Recent Labs     04/01/24  1131 04/01/24  1814 04/02/24  0237 04/02/24  0531   WBC 12.3* 12.5* 13.6*  --    RBC 1.80* 2.14* 2.65*  --    HEMOGLOBIN 5.7* 6.8*  6.8* 8.1* 7.9*   HEMATOCRIT 17.4* 20.0* 23.9*  --    MCV 96.7 93.5 90.2  --    MCH 31.7 32.2 30.6  --    MCHC 32.8 34.5 33.9  --    RDW 48.9 51.6* 51.8*  --    PLATELETCT 171 146* 177  --    MPV 10.4 11.3 10.3  --        Active Hospital Problems    Diagnosis     Anemia [D64.9]     Leukocytosis [D72.829]     Thrombocytopenia ([facility]) [D69.6]     Acute renal failure (ARF) ([facility]) [N17.9]     Nausea and vomiting [R11.2]     Hyponatremia [E87.1]     Fall [W19.XXXA]     Atrial fibrillation ([facility]) [I48.91]     Chronic hypoxic respiratory failure ([facility]) [J96.11]     Aortic stenosis [I35.0]     Hypertension [I10]     Encounter for preoperative assessment [Z01.818]     COPD (chronic obstructive pulmonary disease) ([facility]) [J44.9]     Closed fracture of distal end of femur ([facility]) [S72.409A]        Assessment/Plan:  Patient doing well   LLE in knee immobilizer, dressings changed, incisions without issues  Cleared for DC to rehab by ortho pending medicine clearance  POD#5 S/P Open treatment of left femoral supracondylar fracture without intracondylar extension   Wt bearing status - TTWB   Wound care/Drains - Dressings to be changed every other day by nursing. Or PRN for saturation starting " POD#2  Future Procedures - none planned   Lovenox: Start 3/29, Duration-until ambulatory > 150'  Sutures/Staples out- 14-21 days post operatively. Removal will completed by ortho mid levels only.  PT/OT-initiated  Antibiotics: Perioperative completed  DVT Prophylaxis- TEDS/SCDs/Foot pumps  [place]-not needed per ortho  Case Coordination for Discharge Planning - Disposition per therapy recs.

## 2024-04-02 NOTE — CONSULTS
Gastroenterology Initial Consult Note               Author:  Nellie Cotton M.D. Date & Time Created: 4/2/2024 12:12 PM       Patient ID:  Name:             Jaqui Sifuentes  YOB: 1935  Age:                 88 y.o.  female  MRN:               [id number]      Referring Provider:  Houston Bowden MD        Presenting Chief Complaint:  Coffee grounds emesis and anemia      History of Present Illness:    This is a very pleasant 88 y.o. female on Pradaxa for atrial fibrillation, COPD, chronic hypoxic respiratory failure on 2L/NC admitted 3/27/24 with femur fracture from a fall down the stairs.  She underwent ORIF left femur on 3/28/24.  On admission her Hgb 10.1 with dropping Hgb until 5.7 yesterday.  She was transfused and Hgb 8.1 today.  Initially she was dehydrated with acute kidney injury but BUN remains elevated with normal creatinine.  Iron studies consistent with anemia of chronic disease.    Last dose of enoxaparin 3/31/24  Brown stool recorded on 3/31/24  Last night she had coffee grounds emesis  She had mild reflux prior to admission.  She was taking some antacids on and off.  She has no history of peptic ulcer disease.  She denies dysphagia.  Denies history of peptic ulcer disease.  She has not had melena or hematochezia.  Remote colonoscopy.  Outpatient physician was monitoring her anemia.  She has had ongoing nausea and vomiting since admission          Review of Systems:  Review of Systems   Constitutional: Negative.    HENT: Negative.     Eyes: Negative.    Respiratory: Negative.     Cardiovascular: Negative.    Gastrointestinal:  Positive for abdominal pain, heartburn, nausea and vomiting.   Genitourinary: Negative.    Musculoskeletal: Negative.    Skin: Negative.    Neurological: Negative.    Endo/Heme/Allergies: Negative.              Past Medical History:  Past Medical History:   Diagnosis Date    COPD (chronic obstructive pulmonary disease) (HCC)     Hypertension     Hypothyroid       Active Hospital Problems    Diagnosis     Anemia [D64.9]     Leukocytosis [D72.829]     Thrombocytopenia (HCC) [D69.6]     Acute renal failure (ARF) (Formerly [facility]) [N17.9]     Nausea and vomiting [R11.2]     Hyponatremia [E87.1]     Fall [W19.XXXA]     Atrial fibrillation (HCC) [I48.91]     Chronic hypoxic respiratory failure (HCC) [J96.11]     Aortic stenosis [I35.0]     Hypertension [I10]     Encounter for preoperative assessment [Z01.818]     COPD (chronic obstructive pulmonary disease) (HCC) [J44.9]     Closed fracture of distal end of femur (Former[facility]) [S72.409A]          Past Surgical History:  Past Surgical History:   Procedure Laterality Date    ORIF, FRACTURE, FEMUR Left 3/28/2024    Procedure: ORIF, FRACTURE, FEMUR-DISTAL;  Surgeon: Johan Suero M.D.;  Location: SURGERY [facility];  Service: Orthopedics    KNEE REPLACEMENT, TOTAL Left     LUMPECTOMY Right            Hospital Medications:  Current Facility-Administered Medications   Medication Dose Frequency Provider Last Rate Last Admin    MD Alert...Total Body Iron Replacement per Pharmacy   PHARMACY TO DOSE Houston Bowden M.D.        acetaminophen (Tylenol) tablet 500 mg  500 mg Q6HRS PRN Mazin Tom PMayankAMayank-ANEL   500 mg at 04/01/24 0016    [Held by provider] enoxaparin (Lovenox) inj 30 mg  30 mg DAILY AT 1800 Devora Brooks M.D.   30 mg at 03/31/24 1634    carvedilol (Coreg) tablet 3.125 mg  3.125 mg BID WITH MEALS Devora Brooks M.D.   3.125 mg at 04/02/24 0921    promethazine (Phenergan) suppository 25 mg  25 mg Q6HRS PRN Devora Brooks M.D.        calcium carbonate (Tums) chewable tab 500 mg  500 mg TID PRN GIGI PalaciosP.RMayankN.   500 mg at 03/31/24 2109    albuterol inhaler 2 Puff  2 Puff PRN Ethan Crowe M.D.        atorvastatin (Lipitor) tablet 40 mg  40 mg Nightly Ethan Crowe M.D.   40 mg at 04/01/24 2136    levothyroxine (Synthroid) tablet 175 mcg  175 mcg AM JOO Crowe M.D.   175 mcg at 04/02/24 0559    lisinopril (Prinivil) tablet 5  mg  5 mg QAM Devora Brooks M.D.   5 mg at 04/02/24 0602    montelukast (Singulair) tablet 10 mg  10 mg QAM Devora Brooks M.D.   10 mg at 03/30/24 1633    loratadine (Claritin) tablet 10 mg  10 mg DAILY Devora Brooks M.D.   10 mg at 04/02/24 0603    Pharmacy Consult Request ...Pain Management Review 1 Each  1 Each PHARMACY TO DOSE Johan Suero M.D.        ondansetron (Zofran) syringe/vial injection 4 mg  4 mg Q4HRS PRN Johan Suero M.D.   4 mg at 04/02/24 0606    haloperidol lactate (Haldol) injection 1 mg  1 mg Q6HRS PRN Johan Suero M.D.   1 mg at 03/30/24 0623    scopolamine (Transderm-Scop) patch 1 Patch  1 Patch Q72HRS PRN Johan Suero M.D.   1 Patch at 03/29/24 1104    docusate sodium (Colace) capsule 100 mg  100 mg BID Johan Suero M.D.   100 mg at 04/02/24 0603    senna-docusate (Pericolace Or Senokot S) 8.6-50 MG per tablet 1 Tablet  1 Tablet Nightly Johan Suero M.D.   1 Tablet at 04/01/24 2136    senna-docusate (Pericolace Or Senokot S) 8.6-50 MG per tablet 1 Tablet  1 Tablet Q24HRS PRN Johan Suero M.D.        polyethylene glycol/lytes (Miralax) Packet 1 Packet  1 Packet BID PRN Johan Suero M.D.        magnesium hydroxide (Milk Of Magnesia) suspension 30 mL  30 mL QDAY PRN Johan Suero M.D.        bisacodyl (Dulcolax) suppository 10 mg  10 mg Q24HRS PRN Johan Suero M.D.   10 mg at 03/31/24 1140    sodium phosphate (Fleet) enema 133 mL  1 Each Once PRN Johan Suero M.D.        oxyCODONE immediate-release (Roxicodone) tablet 5 mg  5 mg Q3HRS PRN Johan Suero M.D.        Or    oxyCODONE immediate release (Roxicodone) tablet 10 mg  10 mg Q3HRS PRN Johan Suero M.D.        Or    HYDROmorphone (Dilaudid) injection 0.5 mg  0.5 mg Q3HRS PRN Johan Suero M.D.        ondansetron (Zofran ODT) dispertab 4 mg  4 mg Q4HRS PRN Ethan Crowe M.D.   4 mg at 04/01/24 2301    Pharmacy Consult Request ...Pain Management Review 1 Each  1  Each PHARMACY TO DOSE Ethan Crowe M.D.       Last reviewed on 3/28/2024  3:27 PM by Lizette Leon R.N.       Current Outpatient Medications:  Medications Prior to Admission   Medication Sig Dispense Refill Last Dose    orphenadrine (NORFLEX) 100 MG tablet Take 100 mg by mouth 2 times a day.   3/27/2024 at 0600    albuterol 108 (90 Base) MCG/ACT Aero Soln inhalation aerosol Inhale 2 Puffs as needed for Shortness of Breath.   3/27/2024 at AM    atorvastatin (LIPITOR) 40 MG Tab Take 40 mg by mouth every evening.   3/27/2024 at 1700    carvedilol (COREG) 6.25 MG Tab Take 6.25 mg by mouth 2 times a day with meals.   3/27/2024 at 0600    dabigatran (PRADAXA) 150 MG Cap capsule Take 150 mg by mouth 2 times a day.   3/27/2024 at 0600    furosemide (LASIX) 40 MG Tab Take 80 mg by mouth every morning. 80 mg = 2 tablets   3/27/2024 at 0600    levothyroxine (SYNTHROID) 175 MCG Tab Take 175 mcg by mouth every morning on an empty stomach.   3/27/2024 at 0600    lisinopril (PRINIVIL) 5 MG Tab Take 5 mg by mouth every morning.   3/27/2024 at 0600    montelukast (SINGULAIR) 10 MG Tab Take 10 mg by mouth every morning.   3/27/2024 at 1800    nitroglycerin (NITROSTAT) 0.4 MG SL Tab Place 0.4 mg under the tongue every 5 minutes as needed for Chest Pain.   PRN at PRN    spironolactone (ALDACTONE) 25 MG Tab Take 25 mg by mouth every morning. m   3/27/2024 at 0600    loratadine (CLARITIN) 10 MG Tab Take 10 mg by mouth every day.   3/27/2024 at 0600    Multiple Vitamins-Minerals (PRESERVISION AREDS 2 PO) Take 1 Capsule by mouth 2 times a day.   3/27/2024 at 1700         Medication Allergies:  Allergies   Allergen Reactions    Vancomycin Rash     rash         Family Medical History:  History reviewed. No pertinent family history.      Social History:  Social History     Socioeconomic History    Marital status:      Spouse name: Not on file    Number of children: Not on file    Years of education: Not on file    Highest education  "level: Not on file   Occupational History    Not on file   Tobacco Use    Smoking status: Never    Smokeless tobacco: Never   Substance and Sexual Activity    Alcohol use: Yes     Comment: occ    Drug use: Never    Sexual activity: Not on file   Other Topics Concern    Not on file   Social History Narrative    Not on file     Social Determinants of Health     Financial Resource Strain: Not on file   Food Insecurity: Not on file   Transportation Needs: Not on file   Physical Activity: Not on file   Stress: Not on file   Social Connections: Not on file   Intimate Partner Violence: Not on file   Housing Stability: Not on file         Vital signs:  Weight/BMI: Body mass index is 40.58 kg/m².  /58   Pulse 88   Temp 36.8 °C (98.2 °F) (Temporal)   Resp 18   Ht 1.651 m (5' 5\")   Wt 111 kg (243 lb 13.3 oz)   SpO2 100%   Vitals:    04/01/24 2200 04/02/24 0019 04/02/24 0520 04/02/24 0703   BP: 136/56 (!) 147/70 126/49 133/58   Pulse: 78 79 89 88   Resp: 18 18 20 18   Temp: 36.5 °C (97.7 °F) 36.8 °C (98.2 °F) 36.6 °C (97.8 °F) 36.8 °C (98.2 °F)   TempSrc: Temporal Temporal Temporal Temporal   SpO2: 99% 98% 100% 100%   Weight:       Height:         Oxygen Therapy:  Pulse Oximetry: 100 %, O2 (LPM): 2, O2 Delivery Device: Nasal Cannula    Intake/Output Summary (Last 24 hours) at 4/2/2024 1212  Last data filed at 4/2/2024 0603  Gross per 24 hour   Intake 850 ml   Output 1300 ml   Net -450 ml         Physical Exam:  Physical Exam  Constitutional:       Appearance: She is obese.   HENT:      Head: Normocephalic and atraumatic.      Nose: Nose normal.      Mouth/Throat:      Mouth: Mucous membranes are moist.   Eyes:      Pupils: Pupils are equal, round, and reactive to light.   Cardiovascular:      Rate and Rhythm: Regular rhythm.      Heart sounds: Normal heart sounds.   Pulmonary:      Effort: Pulmonary effort is normal.      Breath sounds: Normal breath sounds.   Abdominal:      General: Bowel sounds are normal.      " Palpations: Abdomen is soft.      Comments: Epigastric and LUQ tenderness    Musculoskeletal:         General: Normal range of motion.      Cervical back: Neck supple.   Skin:     General: Skin is warm and dry.   Neurological:      Mental Status: She is alert and oriented to person, place, and time.                 Labs:  Recent Labs     03/31/24 0527 04/01/24 0857 04/02/24 0237   SODIUM 134* 134* 135   POTASSIUM 4.9 4.9 5.1   CHLORIDE 102 103 103   CO2 23 24 26   BUN 51* 42* 36*   CREATININE 1.41* 0.95 0.89   CALCIUM 8.9 9.1 9.2     Recent Labs     03/31/24 0527 04/01/24 0857 04/02/24 0237   ALTSGPT 6  --   --    ASTSGOT 20  --   --    ALKPHOSPHAT 73  --   --    TBILIRUBIN 0.5  --   --    GLUCOSE 100* 105* 118*     Recent Labs     03/31/24 0527 03/31/24 1007 03/31/24 1007 04/01/24 1131 04/01/24 1814 04/02/24 0237   WBC  --  15.9*   < > 12.3* 12.5* 13.6*   NEUTSPOLYS  --  81.80*  --  82.10*  --  82.10*   LYMPHOCYTES  --  7.90*  --  8.50*  --  8.00*   MONOCYTES  --  9.50  --  7.90  --  8.40   EOSINOPHILS  --  0.10  --  0.40  --  0.40   BASOPHILS  --  0.10  --  0.10  --  0.10   ASTSGOT 20  --   --   --   --   --    ALTSGPT 6  --   --   --   --   --    ALKPHOSPHAT 73  --   --   --   --   --    TBILIRUBIN 0.5  --   --   --   --   --     < > = values in this interval not displayed.     Recent Labs     04/01/24 1131 04/01/24 1334 04/01/24 1814 04/02/24 0237 04/02/24 0531   RBC 1.80*  --  2.14* 2.65*  --    HEMOGLOBIN 5.7*  --  6.8*  6.8* 8.1* 7.9*   HEMATOCRIT 17.4*  --  20.0* 23.9*  --    PLATELETCT 171  --  146* 177  --    IRON 22* 22*  --   --   --    TOTIRONBC 141* 154*  --   --   --      Recent Results (from the past 24 hour(s))   IRON/TOTAL IRON BIND    Collection Time: 04/01/24  1:34 PM   Result Value Ref Range    Iron 22 (L) 40 - 170 ug/dL    Total Iron Binding 154 (L) 250 - 450 ug/dL    Unsat Iron Binding 132 110 - 370 ug/dL    % Saturation 14 (L) 15 - 55 %   COD - Adult (Type and Screen)     Collection Time: 04/01/24  1:34 PM   Result Value Ref Range    ABO Grouping Only B     Rh Grouping Only POS (A)     Antibody Screen-Cod NEG    COMPONENT CELLULAR    Collection Time: 04/01/24  1:34 PM   Result Value Ref Range    Component R       R99                 Red Cells, LR       [id number]   transfused   04/01/24   15:26      Product Type R99     Dispense Status transfused     Unit Number (Barcoded) [id number]     Product Code (Barcoded) E7510Y29     Blood Type (Barcoded) 7300     Component R       R99                 Red Cells, LR       [id number]   transfused   04/01/24   20:52      Product Type R99     Dispense Status transfused     Unit Number (Barcoded) [id number]     Product Code (Barcoded) X5292J21     Blood Type (Barcoded) 7300    HGB    Collection Time: 04/01/24  6:14 PM   Result Value Ref Range    Hemoglobin 6.8 (L) 12.0 - 16.0 g/dL   CBC WITHOUT DIFFERENTIAL    Collection Time: 04/01/24  6:14 PM   Result Value Ref Range    WBC 12.5 (H) 4.8 - 10.8 K/uL    RBC 2.14 (L) 4.20 - 5.40 M/uL    Hemoglobin 6.8 (L) 12.0 - 16.0 g/dL    Hematocrit 20.0 (L) 37.0 - 47.0 %    MCV 93.5 81.4 - 97.8 fL    MCH 32.2 27.0 - 33.0 pg    MCHC 34.5 32.2 - 35.5 g/dL    RDW 51.6 (H) 35.9 - 50.0 fL    Platelet Count 146 (L) 164 - 446 K/uL    MPV 11.3 9.0 - 12.9 fL   RETICULOCYTES COUNT    Collection Time: 04/01/24  6:14 PM   Result Value Ref Range    Reticulocyte Count 4.0 (H) 0.8 - 2.6 %    Retic, Absolute 0.09 0.04 - 0.12 M/uL    Imm. Reticulocyte Fraction 27.8 (H) 2.6 - 16.1 %    Retic Hgb Equivalent 33.1 29.0 - 35.0 pg/cell   CBC WITH DIFFERENTIAL    Collection Time: 04/02/24  2:37 AM   Result Value Ref Range    WBC 13.6 (H) 4.8 - 10.8 K/uL    RBC 2.65 (L) 4.20 - 5.40 M/uL    Hemoglobin 8.1 (L) 12.0 - 16.0 g/dL    Hematocrit 23.9 (L) 37.0 - 47.0 %    MCV 90.2 81.4 - 97.8 fL    MCH 30.6 27.0 - 33.0 pg    MCHC 33.9 32.2 - 35.5 g/dL    RDW 51.8 (H) 35.9 - 50.0 fL    Platelet Count 177 164 - 446 K/uL    MPV 10.3  9.0 - 12.9 fL    Neutrophils-Polys 82.10 (H) 44.00 - 72.00 %    Lymphocytes 8.00 (L) 22.00 - 41.00 %    Monocytes 8.40 0.00 - 13.40 %    Eosinophils 0.40 0.00 - 6.90 %    Basophils 0.10 0.00 - 1.80 %    Immature Granulocytes 1.00 (H) 0.00 - 0.90 %    Nucleated RBC 0.00 0.00 - 0.20 /100 WBC    Neutrophils (Absolute) 11.15 (H) 1.82 - 7.42 K/uL    Lymphs (Absolute) 1.08 1.00 - 4.80 K/uL    Monos (Absolute) 1.14 (H) 0.00 - 0.85 K/uL    Eos (Absolute) 0.05 0.00 - 0.51 K/uL    Baso (Absolute) 0.01 0.00 - 0.12 K/uL    Immature Granulocytes (abs) 0.13 (H) 0.00 - 0.11 K/uL    NRBC (Absolute) 0.00 K/uL   Basic Metabolic Panel    Collection Time: 04/02/24  2:37 AM   Result Value Ref Range    Sodium 135 135 - 145 mmol/L    Potassium 5.1 3.6 - 5.5 mmol/L    Chloride 103 96 - 112 mmol/L    Co2 26 20 - 33 mmol/L    Glucose 118 (H) 65 - 99 mg/dL    Bun 36 (H) 8 - 22 mg/dL    Creatinine 0.89 0.50 - 1.40 mg/dL    Calcium 9.2 8.5 - 10.5 mg/dL    Anion Gap 6.0 (L) 7.0 - 16.0   CREATINE KINASE    Collection Time: 04/02/24  2:37 AM   Result Value Ref Range    CPK Total 157 (H) 0 - 154 U/L   ESTIMATED GFR    Collection Time: 04/02/24  2:37 AM   Result Value Ref Range    GFR (CKD-EPI) 63 >60 mL/min/1.73 m 2   HGB    Collection Time: 04/02/24  5:31 AM   Result Value Ref Range    Hemoglobin 7.9 (L) 12.0 - 16.0 g/dL         Radiology Review:  [id number] VIEW   Final Result      Nonobstructive bowel gas pattern.      DX-PORTABLE FLUOROSCOPY < 1 HOUR   Final Result      Portable fluoroscopy utilized for 1 minute 20 seconds.         INTERPRETING LOCATION: [address] [place] [address], [facility]      DX-FEMUR-2+ LEFT   Final Result      Digitized intraoperative radiograph is submitted for review. This examination is not for diagnostic purpose but for guidance during a surgical procedure. Please see the patient's chart for full procedural details.         INTERPRETING LOCATION: [address] [facility] [address], [place], [address]      DX-KNEE 2- LEFT   Final Result         1.   Comminuted displaced distal femoral metaphyseal fracture      CT-HEAD W/O   Final Result         1.  No acute intracranial abnormality is identified, there are nonspecific white matter changes, commonly associated with small vessel ischemic disease.  Associated mild cerebral atrophy is noted.   2.  Atherosclerosis.               MDM (Data Review):   -Records reviewed and summarized in current documentation  -I personally reviewed and interpreted the laboratory results  -I personally reviewed the radiology images    Assessment/Recommendations:    IMPRESSION:  Hematemesis/ coffee grounds emesis  Anemia, acute on chronic  Nausea and vomiting  Lt femur fx, s/p ORIF  Anticoagulation on hold  COPD    RECS:  I suspect this may be erosive esophagitis leading to intractable nausea and vomiting, hematemesis and worsening anemia.  Plan is for EGD in the morning.  Antibiotics not indicated.  Trial full liquid diet.  N.p.o. at midnight.  Pantoprazole IV was started today.            Nellie Cotton M.D.          Core Quality Measures   Reviewed items:  Labs, Medications and Radiology reports reviewed

## 2024-04-02 NOTE — CARE PLAN
The patient is Watcher - Medium risk of patient condition declining or worsening    Shift Goals  Clinical Goals: Pt will have nausea managed and lab values monitored throughout shift  Patient Goals: nausea management  Family Goals: DERREK    Progress made toward(s) clinical / shift goals:    Pt is alert and oriented, denies pain at this time. Medications administered per MAR. Bed locked and in lowest position, call light in reach.     Problem: Knowledge Deficit - Standard  Goal: Patient and family/care givers will demonstrate understanding of plan of care, disease process/condition, diagnostic tests and medications  Outcome: Progressing     Problem: Skin Integrity  Goal: Skin integrity is maintained or improved  4/2/2024 0208 by Vic Coffey RMayankN.  Outcome: Progressing     Problem: Pain - Standard  Goal: Alleviation of pain or a reduction in pain to the patient’s comfort goal  4/2/2024 0208 by Vic Coffey R.N.  Outcome: Progressing    Patient is not progressing towards the following goals:    Problem: Risk for Bleeding  Goal: Patient will not experience bleeding as evidenced by normal blood pressure, stable hematocrit and hemoglobin levels and desired ranges for coagulation profiles  Outcome: Not Progressing     Hgb noted to be 5.7 with concurrent episode of coffee-ground emesis. Provider notified, blood administered as ordered.

## 2024-04-03 ENCOUNTER — ANESTHESIA EVENT (OUTPATIENT)
Dept: SURGERY | Facility: MEDICAL CENTER | Age: 89
DRG: 480 | End: 2024-04-03
Payer: COMMERCIAL

## 2024-04-03 ENCOUNTER — ANESTHESIA (OUTPATIENT)
Dept: SURGERY | Facility: MEDICAL CENTER | Age: 89
DRG: 480 | End: 2024-04-03
Payer: COMMERCIAL

## 2024-04-03 PROBLEM — K22.10 EROSIVE ESOPHAGITIS: Status: ACTIVE | Noted: 2024-04-03

## 2024-04-03 PROBLEM — E66.01 CLASS 3 SEVERE OBESITY DUE TO EXCESS CALORIES WITH SERIOUS COMORBIDITY AND BODY MASS INDEX (BMI) OF 40.0 TO 44.9 IN ADULT (HCC): Status: ACTIVE | Noted: 2024-04-03

## 2024-04-03 LAB
BASOPHILS # BLD AUTO: 0.2 % (ref 0–1.8)
BASOPHILS # BLD: 0.02 K/UL (ref 0–0.12)
EOSINOPHIL # BLD AUTO: 0.22 K/UL (ref 0–0.51)
EOSINOPHIL NFR BLD: 2.2 % (ref 0–6.9)
ERYTHROCYTE [DISTWIDTH] IN BLOOD BY AUTOMATED COUNT: 54.4 FL (ref 35.9–50)
HCT VFR BLD AUTO: 22 % (ref 37–47)
HGB BLD-MCNC: 7.1 G/DL (ref 12–16)
HGB BLD-MCNC: 7.3 G/DL (ref 12–16)
HGB BLD-MCNC: 7.7 G/DL (ref 12–16)
IMM GRANULOCYTES # BLD AUTO: 0.11 K/UL (ref 0–0.11)
IMM GRANULOCYTES NFR BLD AUTO: 1.1 % (ref 0–0.9)
LYMPHOCYTES # BLD AUTO: 1.21 K/UL (ref 1–4.8)
LYMPHOCYTES NFR BLD: 12.1 % (ref 22–41)
MCH RBC QN AUTO: 31.5 PG (ref 27–33)
MCHC RBC AUTO-ENTMCNC: 33.2 G/DL (ref 32.2–35.5)
MCV RBC AUTO: 94.8 FL (ref 81.4–97.8)
MONOCYTES # BLD AUTO: 0.95 K/UL (ref 0–0.85)
MONOCYTES NFR BLD AUTO: 9.5 % (ref 0–13.4)
NEUTROPHILS # BLD AUTO: 7.49 K/UL (ref 1.82–7.42)
NEUTROPHILS NFR BLD: 74.9 % (ref 44–72)
NRBC # BLD AUTO: 0 K/UL
NRBC BLD-RTO: 0 /100 WBC (ref 0–0.2)
PATHOLOGY CONSULT NOTE: NORMAL
PLATELET # BLD AUTO: 194 K/UL (ref 164–446)
PMV BLD AUTO: 10.3 FL (ref 9–12.9)
RBC # BLD AUTO: 2.32 M/UL (ref 4.2–5.4)
WBC # BLD AUTO: 10 K/UL (ref 4.8–10.8)

## 2024-04-03 PROCEDURE — 700111 HCHG RX REV CODE 636 W/ 250 OVERRIDE (IP): Performed by: INTERNAL MEDICINE

## 2024-04-03 PROCEDURE — A9270 NON-COVERED ITEM OR SERVICE: HCPCS | Performed by: INTERNAL MEDICINE

## 2024-04-03 PROCEDURE — 43239 EGD BIOPSY SINGLE/MULTIPLE: CPT | Performed by: INTERNAL MEDICINE

## 2024-04-03 PROCEDURE — 85025 COMPLETE CBC W/AUTO DIFF WBC: CPT

## 2024-04-03 PROCEDURE — 97112 NEUROMUSCULAR REEDUCATION: CPT

## 2024-04-03 PROCEDURE — 700102 HCHG RX REV CODE 250 W/ 637 OVERRIDE(OP): Performed by: ORTHOPAEDIC SURGERY

## 2024-04-03 PROCEDURE — A9270 NON-COVERED ITEM OR SERVICE: HCPCS | Performed by: HOSPITALIST

## 2024-04-03 PROCEDURE — C9113 INJ PANTOPRAZOLE SODIUM, VIA: HCPCS | Performed by: INTERNAL MEDICINE

## 2024-04-03 PROCEDURE — 88305 TISSUE EXAM BY PATHOLOGIST: CPT | Mod: 59

## 2024-04-03 PROCEDURE — 88312 SPECIAL STAINS GROUP 1: CPT | Mod: 59

## 2024-04-03 PROCEDURE — 0DB68ZX EXCISION OF STOMACH, VIA NATURAL OR ARTIFICIAL OPENING ENDOSCOPIC, DIAGNOSTIC: ICD-10-PCS | Performed by: INTERNAL MEDICINE

## 2024-04-03 PROCEDURE — 770006 HCHG ROOM/CARE - MED/SURG/GYN SEMI*

## 2024-04-03 PROCEDURE — 160203 HCHG ENDO MINUTES - 1ST 30 MINS LEVEL 4: Performed by: INTERNAL MEDICINE

## 2024-04-03 PROCEDURE — 700102 HCHG RX REV CODE 250 W/ 637 OVERRIDE(OP): Performed by: HOSPITALIST

## 2024-04-03 PROCEDURE — 700101 HCHG RX REV CODE 250: Performed by: ANESTHESIOLOGY

## 2024-04-03 PROCEDURE — 36415 COLL VENOUS BLD VENIPUNCTURE: CPT

## 2024-04-03 PROCEDURE — 97535 SELF CARE MNGMENT TRAINING: CPT

## 2024-04-03 PROCEDURE — 85018 HEMOGLOBIN: CPT

## 2024-04-03 PROCEDURE — A9270 NON-COVERED ITEM OR SERVICE: HCPCS | Performed by: ORTHOPAEDIC SURGERY

## 2024-04-03 PROCEDURE — 700102 HCHG RX REV CODE 250 W/ 637 OVERRIDE(OP): Performed by: INTERNAL MEDICINE

## 2024-04-03 PROCEDURE — 700105 HCHG RX REV CODE 258: Performed by: INTERNAL MEDICINE

## 2024-04-03 PROCEDURE — 160035 HCHG PACU - 1ST 60 MINS PHASE I: Performed by: INTERNAL MEDICINE

## 2024-04-03 PROCEDURE — 160002 HCHG RECOVERY MINUTES (STAT): Performed by: INTERNAL MEDICINE

## 2024-04-03 PROCEDURE — 160009 HCHG ANES TIME/MIN: Performed by: INTERNAL MEDICINE

## 2024-04-03 PROCEDURE — 97530 THERAPEUTIC ACTIVITIES: CPT | Mod: CQ

## 2024-04-03 PROCEDURE — 700111 HCHG RX REV CODE 636 W/ 250 OVERRIDE (IP): Mod: JZ | Performed by: ANESTHESIOLOGY

## 2024-04-03 PROCEDURE — A9270 NON-COVERED ITEM OR SERVICE: HCPCS

## 2024-04-03 PROCEDURE — 700102 HCHG RX REV CODE 250 W/ 637 OVERRIDE(OP)

## 2024-04-03 PROCEDURE — 160048 HCHG OR STATISTICAL LEVEL 1-5: Performed by: INTERNAL MEDICINE

## 2024-04-03 PROCEDURE — 99232 SBSQ HOSP IP/OBS MODERATE 35: CPT | Performed by: INTERNAL MEDICINE

## 2024-04-03 RX ORDER — PSEUDOEPHEDRINE HCL 30 MG
100 TABLET ORAL 2 TIMES DAILY
Status: SHIPPED
Start: 2024-04-03

## 2024-04-03 RX ORDER — ONDANSETRON 2 MG/ML
4 INJECTION INTRAMUSCULAR; INTRAVENOUS
Status: DISCONTINUED | OUTPATIENT
Start: 2024-04-03 | End: 2024-04-03 | Stop reason: HOSPADM

## 2024-04-03 RX ORDER — OMEPRAZOLE 40 MG/1
40 CAPSULE, DELAYED RELEASE ORAL 2 TIMES DAILY
Status: SHIPPED
Start: 2024-04-03

## 2024-04-03 RX ORDER — OXYCODONE HYDROCHLORIDE 5 MG/1
5 TABLET ORAL
Qty: 10 TABLET | Refills: 0 | Status: SHIPPED | OUTPATIENT
Start: 2024-04-03 | End: 2024-04-06

## 2024-04-03 RX ORDER — SODIUM CHLORIDE, SODIUM LACTATE, POTASSIUM CHLORIDE, CALCIUM CHLORIDE 600; 310; 30; 20 MG/100ML; MG/100ML; MG/100ML; MG/100ML
INJECTION, SOLUTION INTRAVENOUS CONTINUOUS
Status: DISCONTINUED | OUTPATIENT
Start: 2024-04-03 | End: 2024-04-03 | Stop reason: HOSPADM

## 2024-04-03 RX ORDER — LIDOCAINE HYDROCHLORIDE 20 MG/ML
INJECTION, SOLUTION EPIDURAL; INFILTRATION; INTRACAUDAL; PERINEURAL PRN
Status: DISCONTINUED | OUTPATIENT
Start: 2024-04-03 | End: 2024-04-03 | Stop reason: SURG

## 2024-04-03 RX ORDER — OMEPRAZOLE 20 MG/1
40 CAPSULE, DELAYED RELEASE ORAL 2 TIMES DAILY
Status: DISCONTINUED | OUTPATIENT
Start: 2024-04-03 | End: 2024-04-04 | Stop reason: HOSPADM

## 2024-04-03 RX ORDER — LABETALOL HYDROCHLORIDE 5 MG/ML
5 INJECTION, SOLUTION INTRAVENOUS
Status: DISCONTINUED | OUTPATIENT
Start: 2024-04-03 | End: 2024-04-03 | Stop reason: HOSPADM

## 2024-04-03 RX ORDER — SUCRALFATE ORAL 1 G/10ML
1 SUSPENSION ORAL 4 TIMES DAILY
Status: DISCONTINUED | OUTPATIENT
Start: 2024-04-03 | End: 2024-04-04 | Stop reason: HOSPADM

## 2024-04-03 RX ORDER — DIPHENHYDRAMINE HYDROCHLORIDE 50 MG/ML
12.5 INJECTION INTRAMUSCULAR; INTRAVENOUS
Status: DISCONTINUED | OUTPATIENT
Start: 2024-04-03 | End: 2024-04-03 | Stop reason: HOSPADM

## 2024-04-03 RX ORDER — HYDRALAZINE HYDROCHLORIDE 20 MG/ML
5 INJECTION INTRAMUSCULAR; INTRAVENOUS
Status: DISCONTINUED | OUTPATIENT
Start: 2024-04-03 | End: 2024-04-03 | Stop reason: HOSPADM

## 2024-04-03 RX ORDER — CARVEDILOL 3.12 MG/1
3.12 TABLET ORAL 2 TIMES DAILY WITH MEALS
Status: SHIPPED
Start: 2024-04-03

## 2024-04-03 RX ORDER — EPHEDRINE SULFATE 50 MG/ML
5 INJECTION, SOLUTION INTRAVENOUS
Status: DISCONTINUED | OUTPATIENT
Start: 2024-04-03 | End: 2024-04-03 | Stop reason: HOSPADM

## 2024-04-03 RX ORDER — HALOPERIDOL 5 MG/ML
1 INJECTION INTRAMUSCULAR
Status: DISCONTINUED | OUTPATIENT
Start: 2024-04-03 | End: 2024-04-03 | Stop reason: HOSPADM

## 2024-04-03 RX ORDER — SUCRALFATE ORAL 1 G/10ML
1 SUSPENSION ORAL 4 TIMES DAILY
Qty: 560 ML | Refills: 0 | Status: SHIPPED | OUTPATIENT
Start: 2024-04-03 | End: 2024-04-17

## 2024-04-03 RX ORDER — SODIUM CHLORIDE, SODIUM LACTATE, POTASSIUM CHLORIDE, CALCIUM CHLORIDE 600; 310; 30; 20 MG/100ML; MG/100ML; MG/100ML; MG/100ML
INJECTION, SOLUTION INTRAVENOUS CONTINUOUS
Status: ACTIVE | OUTPATIENT
Start: 2024-04-03 | End: 2024-04-03

## 2024-04-03 RX ADMIN — CARVEDILOL 3.12 MG: 3.12 TABLET, FILM COATED ORAL at 17:58

## 2024-04-03 RX ADMIN — LORATADINE 10 MG: 10 TABLET ORAL at 05:48

## 2024-04-03 RX ADMIN — ATORVASTATIN CALCIUM 40 MG: 40 TABLET, FILM COATED ORAL at 20:41

## 2024-04-03 RX ADMIN — PROPOFOL 20 MG: 10 INJECTION, EMULSION INTRAVENOUS at 09:12

## 2024-04-03 RX ADMIN — SODIUM CHLORIDE, POTASSIUM CHLORIDE, SODIUM LACTATE AND CALCIUM CHLORIDE: 600; 310; 30; 20 INJECTION, SOLUTION INTRAVENOUS at 09:02

## 2024-04-03 RX ADMIN — LEVOTHYROXINE SODIUM 175 MCG: 0.17 TABLET ORAL at 05:44

## 2024-04-03 RX ADMIN — SODIUM CHLORIDE 250 MG: 9 INJECTION, SOLUTION INTRAVENOUS at 20:10

## 2024-04-03 RX ADMIN — DOCUSATE SODIUM 100 MG: 100 CAPSULE, LIQUID FILLED ORAL at 18:01

## 2024-04-03 RX ADMIN — OMEPRAZOLE 40 MG: 20 CAPSULE, DELAYED RELEASE ORAL at 18:01

## 2024-04-03 RX ADMIN — PROPOFOL 20 MG: 10 INJECTION, EMULSION INTRAVENOUS at 09:14

## 2024-04-03 RX ADMIN — SUCRALFATE 1 G: 1 SUSPENSION ORAL at 15:28

## 2024-04-03 RX ADMIN — FENTANYL CITRATE 50 MCG: 50 INJECTION, SOLUTION INTRAMUSCULAR; INTRAVENOUS at 09:06

## 2024-04-03 RX ADMIN — SUCRALFATE 1 G: 1 SUSPENSION ORAL at 22:16

## 2024-04-03 RX ADMIN — PROPOFOL 30 MG: 10 INJECTION, EMULSION INTRAVENOUS at 09:08

## 2024-04-03 RX ADMIN — DOCUSATE SODIUM 50 MG AND SENNOSIDES 8.6 MG 1 TABLET: 8.6; 5 TABLET, FILM COATED ORAL at 20:41

## 2024-04-03 RX ADMIN — SUCRALFATE 1 G: 1 SUSPENSION ORAL at 20:41

## 2024-04-03 RX ADMIN — LIDOCAINE HYDROCHLORIDE 100 MG: 20 INJECTION, SOLUTION EPIDURAL; INFILTRATION; INTRACAUDAL at 09:06

## 2024-04-03 RX ADMIN — PANTOPRAZOLE SODIUM 40 MG: 40 INJECTION, POWDER, LYOPHILIZED, FOR SOLUTION INTRAVENOUS at 05:44

## 2024-04-03 RX ADMIN — DOCUSATE SODIUM 100 MG: 100 CAPSULE, LIQUID FILLED ORAL at 05:47

## 2024-04-03 RX ADMIN — LISINOPRIL 5 MG: 5 TABLET ORAL at 05:48

## 2024-04-03 RX ADMIN — ACETAMINOPHEN 500 MG: 500 TABLET, FILM COATED ORAL at 22:16

## 2024-04-03 RX ADMIN — PROPOFOL 30 MG: 10 INJECTION, EMULSION INTRAVENOUS at 09:10

## 2024-04-03 RX ADMIN — MONTELUKAST 10 MG: 10 TABLET, FILM COATED ORAL at 18:01

## 2024-04-03 ASSESSMENT — PATIENT HEALTH QUESTIONNAIRE - PHQ9
2. FEELING DOWN, DEPRESSED, IRRITABLE, OR HOPELESS: NOT AT ALL
1. LITTLE INTEREST OR PLEASURE IN DOING THINGS: NOT AT ALL
SUM OF ALL RESPONSES TO PHQ9 QUESTIONS 1 AND 2: 0

## 2024-04-03 ASSESSMENT — ENCOUNTER SYMPTOMS
WEIGHT LOSS: 0
SHORTNESS OF BREATH: 0
VOMITING: 0
FOCAL WEAKNESS: 0
BLOOD IN STOOL: 0
HEADACHES: 0
FEVER: 0
HEARTBURN: 1
FALLS: 1
DIZZINESS: 0
ABDOMINAL PAIN: 0
DEPRESSION: 0
PSYCHIATRIC NEGATIVE: 1
CONSTITUTIONAL NEGATIVE: 1
NAUSEA: 1
MYALGIAS: 0
CHILLS: 0
WEAKNESS: 1

## 2024-04-03 ASSESSMENT — COGNITIVE AND FUNCTIONAL STATUS - GENERAL
CLIMB 3 TO 5 STEPS WITH RAILING: TOTAL
MOBILITY SCORE: 15
TURNING FROM BACK TO SIDE WHILE IN FLAT BAD: A LITTLE
DRESSING REGULAR UPPER BODY CLOTHING: A LITTLE
TOILETING: A LOT
DAILY ACTIVITIY SCORE: 17
MOVING FROM LYING ON BACK TO SITTING ON SIDE OF FLAT BED: A LITTLE
DRESSING REGULAR LOWER BODY CLOTHING: A LOT
HELP NEEDED FOR BATHING: A LOT
WALKING IN HOSPITAL ROOM: A LOT
SUGGESTED CMS G CODE MODIFIER DAILY ACTIVITY: CK
STANDING UP FROM CHAIR USING ARMS: A LITTLE
SUGGESTED CMS G CODE MODIFIER MOBILITY: CK
MOVING TO AND FROM BED TO CHAIR: A LITTLE

## 2024-04-03 ASSESSMENT — GAIT ASSESSMENTS: GAIT LEVEL OF ASSIST: UNABLE TO PARTICIPATE

## 2024-04-03 NOTE — THERAPY
Occupational Therapy  Daily Treatment     Patient Name: Jaqui Sifuentes  Age:  88 y.o., Sex:  female  Medical Record #: [id number]  Today's Date: 4/3/2024     Precautions  Precautions: Fall Risk, Toe Touch Weight Bearing Left Lower Extremity, Immobilizer Left Lower Extremity  Comments: immobilizer AAT    Assessment    Pt seen for OT session. Progressing with activity tolerance and strength. Very motivated to participate. Continues to be limited by decreased functional mobility, activity tolerance, strength, balance, adherence to precautions, and pain which are currently affecting pt's ability to complete ADLs/IADLs at baseline. Will continue to follow.     Plan    Treatment Plan Status: Continue Current Treatment Plan  Type of Treatment: Self Care / Activities of Daily Living, Adaptive Equipment, Neuro Re-Education / Balance, Therapeutic Exercises, Therapeutic Activity  Treatment Frequency: 4 Times per Week  Treatment Duration: Until Therapy Goals Met    DC Equipment Recommendations: Unable to determine at this time  Discharge Recommendations: Recommend post-acute placement for additional occupational therapy services prior to discharge home     Objective     04/03/24 1556   Precautions   Precautions Fall Risk;Toe Touch Weight Bearing Left Lower Extremity;Immobilizer Left Lower Extremity   Comments immobilizer AAT   Vitals   O2 (LPM) 1.5   O2 Delivery Device Nasal Cannula   Vitals Comments increase WOB during txf, but recovered quickly and no c/o dizziness   Pain 0 - 10 Group   Therapist Pain Assessment During Activity;Nurse Notified;Post Activity Pain Same as Prior to Activity  (no c/o pain)   Cognition    Cognition / Consciousness WDL   Level of Consciousness Alert   Comments very pleasant and cooperative; motivated to participate   Passive ROM Upper Body   Passive ROM Upper Body WDL   Active ROM Upper Body   Active ROM Upper Body  WDL   Strength Upper Body   Upper Body Strength  X   Comments functional for light  "ADLs, but fatigues quickly w/ standing/FWW   Supine Upper Body Exercises   Comments encouraged continued AROM exercises w/BUEs, and began education on theraband/exercises. Pt declined d/t leaving for IPR in [place] tomorrow at 8am.   Neuro-Muscular Treatments   Neuro-Muscular Treatments Anterior weight shift;Compensatory Strategies;Facilitation;Sequencing;Tactile Cuing;Weight Shift Right;Weight Shift Left;Verbal Cuing   Comments STS and txf from chair>BTB; \"shimmied\" RLE instead of \"hopping\". Demo'd technique for \"hopping\" with FWW to reduce anxiousness   Other Treatments   Other Treatments Provided Educated on role of OT/likely treatment sessions at IRF, appropriate technique for SPTxfs, clothing recommendation for IRF activity, continued AROM exercises, s/s of low hgb so able to articulate to therapists at IRF, adaptive technique for moving LLE w/immobilizer straps, and increasing frequency of getting up to chair/BSC, but limiting amount of time sitting up with LLE in dependent position.   Balance   Sitting Balance (Static) Fair   Sitting Balance (Dynamic) Fair   Standing Balance (Static) Fair -   Standing Balance (Dynamic) Poor +   Weight Shift Sitting Fair   Weight Shift Standing Absent   Skilled Intervention Verbal Cuing;Sequencing;Tactile Cuing;Compensatory Strategies   Comments w/FWW   Bed Mobility    Supine to Sit   (found up in chair)   Sit to Supine Moderate Assist  (for LLE)   Scooting Standby Assist   Skilled Intervention Verbal Cuing;Tactile Cuing;Facilitation;Compensatory Strategies;Sequencing   Comments able to scoot self up in supine with rails   Activities of Daily Living   Eating Supervision   Grooming Supervision;Seated   Lower Body Dressing Moderate Assist  (knee immobilizer and socks)   Toileting   (declined need, but likely able to txf to BSC)   Skilled Intervention Verbal Cuing;Tactile Cuing;Facilitation;Compensatory Strategies   Functional Mobility   Sit to Stand Contact Guard Assist   Bed, " Chair, Wheelchair Transfer Minimal Assist   Toilet Transfers   (declined need, but likely able to get to BSC)   Transfer Method Stand Pivot   Mobility chair>BTB w/ FWW   Skilled Intervention Verbal Cuing;Tactile Cuing;Facilitation;Compensatory Strategies   Activity Tolerance   Comments limited by fatigue   Patient / Family Goals   Patient / Family Goal #1 To have rehab before going home   Goal #1 Outcome Progressing as expected   Short Term Goals   Short Term Goal # 1 Pt will complete LBD with Popeye   Goal Outcome # 1 Progressing as expected   Short Term Goal # 2 Pt will txfer to BSC with modA   Goal Outcome # 2 Progressing as expected   Short Term Goal # 3 Pt will complete toileting with modA   Goal Outcome # 3 Progressing as expected   Education Group   Education Provided Upper Extremity Strengthening;Activities of Daily Living;Pathology of bedrest   UE Strengthening Patient Response Patient;Acceptance;Explanation;Verbal Demonstration;Reinforcement Needed   ADL Patient Response Patient;Acceptance;Explanation;Verbal Demonstration;Reinforcement Needed   Pathology of Bedrest Patient Response Patient;Acceptance;Explanation;Verbal Demonstration;Reinforcement Needed

## 2024-04-03 NOTE — PROGRESS NOTES
Report given to Preop Nurse for EGD scheduled at 0900 AM. For transport  at 8:30 AM. Patient made aware and verbalized understanding.

## 2024-04-03 NOTE — PROGRESS NOTES
[facility] Medicine Daily Progress Note    Date of Service  4/3/2024    Chief Complaint  Jaqui Sifuentes is a 88 y.o. female admitted 3/27/2024 with leg pain after a mechanical fall    Hospital Course  Jaqui Sifuentes is a 88 y.o. female who presented to [facility] after a ground level fall on 3/27/2024. She has a past medical history of COPD, chronic hypoxic respiratory failure on 2 L oxygen, A-fib, hypothyroidism,and  mild aortic stenosis. She was walking down stairs when she had a misstep and collapsed.  She did hit her head but denied losing consciousness.  She denied lightheadedness or dizziness.  She deniesd any nausea, vomiting, diarrhea, shortness of breath, back pain, fevers.  Her only other complaint was left hand pain.  The patient does take Pradaxa and last took it 3/27 morning.     X-ray of the femur found communicated displaced distal femoral metaphyseal fracture  CT scan of the head was negative  EKG found to be controlled A-fib. She underwent an ORIF of the Left femur on 3/28/24 with Dr. Suero and tolerated this well. She has had limited pain. She did develop perioperative nausea and vomiting and pain medications were likely contributing to this. The emesis led to acute dehydration and acute renal failure so her chronic diuretics were held - this is resolving.    Her chronic anemia also worsened, prompting holding of her chronic anticoagulation and need for a prbc transfusion.    Interval Problem Update  4/2  Patient continues to have nausea and vomiting, had some coffee ground emesis overnight. Patient has ongoing epigastric pain and worsening GERD like symptoms. This has been worsening before the surgery with worsening anemia in the outpatient world. Had a COLO many years ago which is normal and denies any change to her bowel habits recently. Is passing some gas and had a small fecal smear yesterday. Surgical site feels fine without any pain. Family is concerned about getting her back to [place].  I personally spoke with Dr Beaver of GI about a possible EGD. Received 2 U PRBC's yesterday with appropriate response.     4/3  S/P EGD with innumerous ulcers throughout stomach and duodenum with EE. She feels much better. Plan is to go back to [place] tomorrow.     I have discussed this patient's plan of care and discharge plan at IDT rounds today with Case Management, Nursing, Nursing leadership, and other members of the IDT team.    Consultants/Specialty  [facility]  [facility]    Code Status  DNAR/DNI    Disposition  The patient is medically cleared for discharge to home or a post-acute facility.  Anticipate discharge to: skilled nursing facility    I have placed the appropriate orders for post-discharge needs.    Review of Systems  Review of Systems   Constitutional: Negative.  Negative for chills, fever and weight loss.   Respiratory:  Negative for shortness of breath.    Cardiovascular:  Negative for chest pain and leg swelling.   Gastrointestinal:  Positive for heartburn and nausea. Negative for abdominal pain, blood in stool, melena and vomiting.        Significant improvement noted   Musculoskeletal:  Positive for falls. Negative for joint pain and myalgias.   Neurological:  Positive for weakness. Negative for dizziness, focal weakness and headaches.   Endo/Heme/Allergies: Negative.    Psychiatric/Behavioral: Negative.  Negative for depression.    All other systems reviewed and are negative.       Physical Exam  Temp:  [36.1 °C (97 °F)-36.6 °C (97.9 °F)] 36.6 °C (97.8 °F)  Pulse:  [] 84  Resp:  [16-20] 18  BP: (107-147)/(46-60) 117/47  SpO2:  [97 %-100 %] 97 %    Physical Exam  Vitals and nursing note reviewed. Exam conducted with a chaperone present.   Constitutional:       General: She is not in acute distress.     Appearance: Normal appearance. She is not diaphoretic.      Comments: Body mass index is 40.58 kg/m².  Much improved today     HENT:      Head: Normocephalic.      Nose: Nose normal.       Mouth/Throat:      Mouth: Mucous membranes are moist.   Eyes:      Pupils: Pupils are equal, round, and reactive to light.   Cardiovascular:      Rate and Rhythm: Normal rate and regular rhythm.      Pulses: Normal pulses.      Heart sounds: Normal heart sounds.   Pulmonary:      Effort: Pulmonary effort is normal.      Breath sounds: Normal breath sounds.   Abdominal:      General: Abdomen is flat. Bowel sounds are normal. There is no distension.      Palpations: Abdomen is soft.      Tenderness: There is abdominal tenderness (mild in epigastric region).   Musculoskeletal:         General: No swelling or deformity. Normal range of motion.      Right lower leg: No edema.      Left lower leg: No edema.      Comments: L leg surgical bandages in place, leg bandages changed after bleeding at surgical site, now cdi, remains unchanged on 4/3   Skin:     General: Skin is warm and dry.      Capillary Refill: Capillary refill takes less than 2 seconds.      Coloration: Skin is pale.   Neurological:      General: No focal deficit present.      Mental Status: She is alert and oriented to person, place, and time.      Cranial Nerves: No cranial nerve deficit.   Psychiatric:         Mood and Affect: Mood normal.         Behavior: Behavior normal.         Fluids    Intake/Output Summary (Last 24 hours) at 4/3/2024 1516  Last data filed at 4/3/2024 0923  Gross per 24 hour   Intake 1080 ml   Output 1250 ml   Net -170 ml       Laboratory  Recent Labs     04/01/24  1814 04/02/24  0237 04/02/24  0531 04/02/24  2023 04/03/24  0454 04/03/24  1218   WBC 12.5* 13.6*  --   --  10.0  --    RBC 2.14* 2.65*  --   --  2.32*  --    HEMOGLOBIN 6.8*  6.8* 8.1*   < > 7.5* 7.3* 7.7*   HEMATOCRIT 20.0* 23.9*  --   --  22.0*  --    MCV 93.5 90.2  --   --  94.8  --    MCH 32.2 30.6  --   --  31.5  --    MCHC 34.5 33.9  --   --  33.2  --    RDW 51.6* 51.8*  --   --  54.4*  --    PLATELETCT 146* 177  --   --  194  --    MPV 11.3 10.3  --   --   10.3  --     < > = values in this interval not displayed.     Recent Labs     04/01/24  0857 04/02/24  0237   SODIUM 134* 135   POTASSIUM 4.9 5.1   CHLORIDE 103 103   CO2 24 26   GLUCOSE 105* 118*   BUN 42* 36*   CREATININE 0.95 0.89   CALCIUM 9.1 9.2                     Imaging  [id number] VIEW   Final Result      Nonobstructive bowel gas pattern.      DX-PORTABLE FLUOROSCOPY < 1 HOUR   Final Result      Portable fluoroscopy utilized for 1 minute 20 seconds.         INTERPRETING LOCATION: [address], [place], [address]      DX-FEMUR-2+ LEFT   Final Result      Digitized intraoperative radiograph is submitted for review. This examination is not for diagnostic purpose but for guidance during a surgical procedure. Please see the patient's chart for full procedural details.         INTERPRETING LOCATION: [address], [place], [address]      DX-KNEE 2- LEFT   Final Result         1.  Comminuted displaced distal femoral metaphyseal fracture      CT-HEAD W/O   Final Result         1.  No acute intracranial abnormality is identified, there are nonspecific white matter changes, commonly associated with small vessel ischemic disease.  Associated mild cerebral atrophy is noted.   2.  Atherosclerosis.              Assessment/Plan  * Closed fracture of distal end of femur (HCC)- (present on admission)  Assessment & Plan  Pain control with oral and IV narcotics, improving nicely, SNF In [place] planned for 4/4  S/p surgical repair with Dr Suero 3/28/24  SCD's  Monitor  As noted above  Ortho following    Class 3 severe obesity due to excess calories with serious comorbidity and body mass index (BMI) of 40.0 to 44.9 in adult (HCC)- (present on admission)  Assessment & Plan  Has lost 25 pounds, encourage outpatient nutritional counseling    Erosive esophagitis- (present on admission)  Assessment & Plan  Noted on EGD 4/3 with stomach ulcers and duodenal ulcers  H pylori bx pending  Convert to Prilosec 40 mg BID    Anemia- (present on  admission)  Assessment & Plan  Appears to have acute blood loss anemia at this time which is multi-factorial: an element of hemdilution, blood loss from orthopedic surgery and possible UGI source  S/P 2 U PRBC's on 4/1, ahs stabilized now  EGD as noted above          Thrombocytopenia (HCC)- (present on admission)  Assessment & Plan  Improved, now normalized  I personally reviewed the CBC on 4/3    Leukocytosis- (present on admission)  Assessment & Plan  resolved  Suspect stress reaction from surgery and possible UGIB  Monitor  Defer abx's for now    Hyponatremia- (present on admission)  Assessment & Plan  Resolved  I personally reviewed the bmp on 4/2    Nausea and vomiting- (present on admission)  Assessment & Plan  2/2 PUD as noted below  Improving with ppi      Acute renal failure (ARF) (HCC)- (present on admission)  Assessment & Plan  Resolved  Hold outpatient diuretics for this time  Stop IVF's    COPD (chronic obstructive pulmonary disease) (HCC)- (present on admission)  Assessment & Plan  Not in exacerbation  Continue home inhalers    Encounter for preoperative assessment- (present on admission)  Assessment & Plan        Hypertension- (present on admission)  Assessment & Plan  BP remains stable  Continue lisinopril 5 mg daily, coreg 3.125 mg BID at dose reductions  Continue to hold MRA and lasix  Adjust prn, noted 4/3    Aortic stenosis- (present on admission)  Assessment & Plan  Mild that was found on echo in 2022    Chronic hypoxic respiratory failure (HCC)- (present on admission)  Assessment & Plan  Currently stable on 1L which is below her baseline of 2L  No evidence of acute exacerbation    Atrial fibrillation (HCC)- (present on admission)  Assessment & Plan  Rate controlled  Continue Coreg but decrease dose due to mild hypotension, following closely and adjusting dose as needed  Holding pradaxa due to anemia, see above, following  No changes planned 4/3, can likely restart pradaxa upon transfer to  sNF      Fall- (present on admission)  Assessment & Plan  Patient denies any other symptoms including back pain headache  Reports it was purely mechanical  PT and OT--family requesting IPR in [place], referrals placed and CM following           VTE prophylaxis:  pradaxa and lovenox hld    I have performed a physical exam and reviewed and updated ROS and Plan today (4/3/2024). In review of yesterday's note (4/2/2024), there are no changes except as documented above.          SCDs/TEDs

## 2024-04-03 NOTE — ANESTHESIA TIME REPORT
Anesthesia Start and Stop Event Times       Date Time Event    4/3/2024 0856 Ready for Procedure     0902 Anesthesia Start     0926 Anesthesia Stop          Responsible Staff  04/03/24      Name Role Begin End    Ranjeet Thompson M.D. Anesth 0902 0926          Overtime Reason:  no overtime (within assigned shift)    Comments:

## 2024-04-03 NOTE — OR NURSING
0922 Patient arrived to PACU from OR. Report received. Patient attached to monitoring. VSS. Patient oxygenating well on 6 L via mask.     0945 Patient meets phase two criteria. Tolerating PO liquids without complication.     0958 Report given to Juliann RN     1007 [facility]

## 2024-04-03 NOTE — ASSESSMENT & PLAN NOTE
Noted on EGD 4/3 with stomach ulcers and duodenal ulcers  H pylori bx pending  Convert to Prilosec 40 mg BID

## 2024-04-03 NOTE — PROGRESS NOTES
"    Orthopedic PA Progress Note    Interval changes:  Patient doing ok. To endoscopy today for coffee ground emesis  LLE dressings are CDI  TTWB LLE with KI  Cleared for DC from orthopedic standpoint pending therapy recs and medical optimization    ROS - Patient denies any new issues.  Denies any numbness or tingling. Pain well controlled.    /54   Pulse 78   Temp 36.6 °C (97.9 °F) (Temporal)   Resp 17   Ht 1.651 m (5' 5\")   Wt 111 kg (243 lb 13.3 oz)   SpO2 100%     Patient seen and examined  No acute distress  Breathing non labored  RRR  LLE: Dressing is clean, dry, and intact. Patient clearly fires tibialis anterior, EHL, and gastrocnemius/soleus. Sensation is intact to light touch throughout superficial peroneal, deep peroneal, tibial, saphenous, and sural nerve distributions. Strong and palpable 2+ dorsalis pedis and posterior tibial pulses with capillary refill less than 2 seconds.     Recent Labs     04/01/24  1814 04/02/24  0237 04/02/24  0531 04/02/24  1317 04/02/24  2023 04/03/24  0454   WBC 12.5* 13.6*  --   --   --  10.0   RBC 2.14* 2.65*  --   --   --  2.32*   HEMOGLOBIN 6.8*  6.8* 8.1*   < > 8.0* 7.5* 7.3*   HEMATOCRIT 20.0* 23.9*  --   --   --  22.0*   MCV 93.5 90.2  --   --   --  94.8   MCH 32.2 30.6  --   --   --  31.5   MCHC 34.5 33.9  --   --   --  33.2   RDW 51.6* 51.8*  --   --   --  54.4*   PLATELETCT 146* 177  --   --   --  194   MPV 11.3 10.3  --   --   --  10.3    < > = values in this interval not displayed.       Active Hospital Problems    Diagnosis     Anemia [D64.9]     Leukocytosis [D72.829]     Thrombocytopenia (HCC) [D69.6]     Acute renal failure (ARF) (HCC) [N17.9]     Nausea and vomiting [R11.2]     Hyponatremia [E87.1]     Fall [W19.XXXA]     Atrial fibrillation (HCC) [I48.91]     Chronic hypoxic respiratory failure (HCC) [J96.11]     Aortic stenosis [I35.0]     Hypertension [I10]     Encounter for preoperative assessment [Z01.818]     COPD (chronic obstructive " pulmonary disease) ([facility]) [J44.9]     Closed fracture of distal end of femur ([facility]) [S72.409A]        Assessment/Plan:  Patient doing ok. To endoscopy today for coffee ground emesis  LLE dressings are CDI  TTWB LLE with KI  Cleared for DC from orthopedic standpoint pending therapy recs and medical optimization    POD#6 S/p  Open treatment of left femoral supracondylar fracture without intracondylar extension   Wt bearing status - TTWB LLE in   Wound care/Drains - Dressings to be changed every other day by nursing. Or PRN for saturation starting POD#2  Future Procedures - None planned   Lovenox: Start 3/29*, Duration-until ambulatory > 150'  Sutures/Staples out- 14-21 days post operatively. Removal will completed by ortho LAURA's unless transferred.  DVT Prophylaxis outpatient: ASA 81 mg PO BID x4 weeks  PT/OT-initiated  Antibiotics:  Perioperative completed  DVT Prophylaxis- TEDS/SCDs/Foot pumps  Newman-not needed per ortho  Case Coordination for Discharge Planning - Disposition per therapy recs.

## 2024-04-03 NOTE — DISCHARGE SUMMARY
Discharge Summary    CHIEF COMPLAINT ON ADMISSION  Chief Complaint   Patient presents with    Fall     [place] she missed the last step of the stairs and fell. +headstrike, on thinners for afib, -LOC. Pt reports L knee pain       Reason for Admission  TBI    Admission Date  3/27/2024     CODE STATUS  DNAR/DNI    HPI & HOSPITAL COURSE  This is a 88 y.o. female here with chronic respiratory failure with hypoxia on 2 L nasal cannula secondary to COPD, atrial fibrillation on Pradaxa and underlying hypothyroidism who presented to our hospital after a fall.  She had been traveling to [facility] from [place] and sustained a fall while walking down stairs and misstepped.  She hit her head but denied losing consciousness and her CT head was normal.  She does take Pradaxa and took it in the morning before admission.  X-ray of the femur showed a communicated displaced distal femoral metaphyseal fracture.  Orthopedic surgery was consulted.    She underwent open treatment of left femoral supracondylar fracture without intercondylar extension with good effect on March 20, 2024.  Postoperatively she had acute kidney injury secondary to dehydration, blood loss and ongoing medications.  Multiple outpatient blood pressure medications and diuretics including spironolactone, lisinopril, and Lasix were held.  Her blood pressure eventually improved on reduced doses of lisinopril and her spironolactone can be restarted at the skilled nursing facility and her Lasix could be restarted at 20 mg daily at the SNF and eventually titrated back up to her 80 mg at baseline.    Postoperatively she developed worsening acute blood loss anemia and ongoing nausea and vomiting with heartburn-like symptoms and occasional coffee-ground emesis.  She required 2 units of packed red blood cells on April 1 with appropriate response.  GI was consulted and the patient underwent EGD on April 3, 2024 where the following was found:    1.  Esophagus: desquamating mucosa distal half esophagus with erosions, no stigmata of recent bleeding  2. Stomach: multiple clean based antral ulcers with scattered erosions. No blood in lumen.  Gastric bx taken for H pylori.  At the cardia, mucosa at 6:00 appeared abnormal and biopsies taken.  Appears to be carditis.  3. Duodenum: entire bulb was ulcerated with multiple pigmented spots too extensive to treat.  No blood in lumen.  Did not attempt trying to find lumen to pass to second portion due to extensive ulceration    She tolerated this procedure well and her symptoms greatly improved with IV Protonix.  She has been transition to oral omeprazole 40 mg twice daily which she will need to remain on for 30 days.  Random biopsies for H. pylori have been obtained and will be notified of the results of positive.  She will require initiation with GI in [facility] and a repeat EGD in 2 months from now.    Her surgical site looks great and PT OT recommended ongoing rehabilitation at an inpatient rehabilitation hospital in [facility].  She is now medically stable for transfer there.    Therefore, she is discharged in good and stable condition to skilled nursing facility.    The patient met 2-midnight criteria for an inpatient stay at the time of discharge.      FOLLOW UP ITEMS POST DISCHARGE  Adjust blood pressure and diuretic dosing as outlined above while at skilled nursing facility  Establish care with a GI doctor and repeat EGD in 2 months  Establish care with an orthopedic surgeon in [facility] and follow-up with postoperative care in 1 to 2 weeks.    DISCHARGE DIAGNOSES  Principal Problem:    Closed fracture of distal end of femur (HCC) (POA: Yes)  Active Problems:    Fall (POA: Yes)    Atrial fibrillation (HCC) (POA: Yes)    Chronic hypoxic respiratory failure (HCC) (POA: Yes)    Aortic stenosis (POA: Yes)    Hypertension (POA: Yes)    Encounter for preoperative assessment (POA: Yes)    COPD (chronic obstructive  pulmonary disease) (HCC) (POA: Yes)    Acute renal failure (ARF) ([facility]) (POA: Yes)    Nausea and vomiting (POA: Yes)    Hyponatremia (POA: Yes)    Leukocytosis (POA: Yes)    Thrombocytopenia (HCC) (POA: Yes)    Anemia (POA: Yes)    Erosive esophagitis (POA: Yes)    Class 3 severe obesity due to excess calories with serious comorbidity and body mass index (BMI) of 40.0 to 44.9 in adult (HCC) (POA: Yes)  Resolved Problems:    * No resolved hospital problems. *      FOLLOW UP  No future appointments.  [facility]  [address] [address]  [phone]          MEDICATIONS ON DISCHARGE     Medication List        START taking these medications        Instructions   docusate sodium 100 MG Caps   Take 100 mg by mouth 2 times a day.  Dose: 100 mg     omeprazole 40 MG delayed-release capsule  Commonly known as: PriLOSEC   Take 1 Capsule by mouth 2 times a day.  Dose: 40 mg     oxyCODONE immediate-release 5 MG Tabs  Commonly known as: Roxicodone   Take 1 Tablet by mouth every 3 hours as needed (moderate to severe pain) for up to 3 days.  Dose: 5 mg     sucralfate 1 GM/10ML Susp  Commonly known as: Carafate   Take 10 mL by mouth 4 times a day for 14 days.  Dose: 1 g            CHANGE how you take these medications        Instructions   carvedilol 3.125 MG Tabs  What changed:   medication strength  how much to take  Commonly known as: Coreg   Take 1 Tablet by mouth 2 times a day with meals.  Dose: 3.125 mg            CONTINUE taking these medications        Instructions   albuterol 108 (90 Base) MCG/ACT Aers inhalation aerosol   Inhale 2 Puffs as needed for Shortness of Breath.  Dose: 2 Puff     atorvastatin 40 MG Tabs  Commonly known as: Lipitor   Take 40 mg by mouth every evening.  Dose: 40 mg     dabigatran 150 MG Caps capsule  Commonly known as: Pradaxa   Take 150 mg by mouth 2 times a day.  Dose: 150 mg     levothyroxine 175 MCG Tabs  Commonly known as: Synthroid   Take 175 mcg  by mouth every morning on an empty stomach.  Dose: 175 mcg     lisinopril 5 MG Tabs  Commonly known as: Prinivil   Take 5 mg by mouth every morning.  Dose: 5 mg     loratadine 10 MG Tabs  Commonly known as: Claritin   Take 10 mg by mouth every day.  Dose: 10 mg     montelukast 10 MG Tabs  Commonly known as: Singulair   Take 10 mg by mouth every morning.  Dose: 10 mg     nitroglycerin 0.4 MG Subl  Commonly known as: Nitrostat   Place 0.4 mg under the tongue every 5 minutes as needed for Chest Pain.  Dose: 0.4 mg     orphenadrine 100 MG tablet  Commonly known as: Norflex   Take 100 mg by mouth 2 times a day.  Dose: 100 mg     PRESERVISION AREDS 2 PO   Take 1 Capsule by mouth 2 times a day.  Dose: 1 Capsule     spironolactone 25 MG Tabs  Commonly known as: Aldactone   Take 25 mg by mouth every morning. m  Dose: 25 mg            STOP taking these medications      furosemide 40 MG Tabs  Commonly known as: Lasix              Allergies  Allergies   Allergen Reactions    Vancomycin Rash     rash       DIET  Orders Placed This Encounter   Procedures    Diet Order Diet: Full Liquid     Standing Status:   Standing     Number of Occurrences:   1     Order Specific Question:   Diet:     Answer:   Full Liquid [11]       ACTIVITY  As tolerated and directed by skilled nursing.  Weight bearing as tolerated    LINES, DRAINS, AND WOUNDS  This is an automated list. Peripheral IVs will be removed prior to discharge.  Peripheral IV 04/01/24 20 G Anterior;Left Forearm (Active)   Site Assessment Clean;Dry;Intact 04/03/24 0857   Dressing Type Transparent 04/03/24 0857   Line Status Flushed;Infusing;Scrubbed the hub prior to access 04/03/24 0857   Dressing Status Clean;Dry;Intact 04/03/24 0857   Dressing Intervention N/A 04/03/24 0857   Dressing Change Due 04/06/24 04/02/24 2110   Date Primary Tubing Changed 04/02/24 04/02/24 2110   Date Secondary Tubing Changed 04/02/24 04/02/24 2110   Infiltration Grading ([facility], [facility]) 0 04/03/24 0857  "  Phlebitis Scale ([facility] Only) 0 04/03/24 0857     External Urinary Catheter (Female Wick) (Active)   Collection Container Suction container 04/02/24 2014   Output (mL) 200 mL 04/03/24 0547      Wound 03/28/24 Incision Knee Left XEROFORM, 4 X 4, 6\" SOFT ROLL, 6\" ACE, KNEE IMMOBILIZER (Active)   Site Assessment DERREK 04/03/24 0857   Periwound Assessment DERREK 04/03/24 0857   Drainage Amount None 04/02/24 2050   Dressing Status Clean;Dry;Intact 04/02/24 2050   Dressing Changed Observed 04/02/24 2050   Dressing Options Ace Wrap;Dry Gauze;Petrolatum Gauze (yellow) 04/02/24 2050       Peripheral IV 04/01/24 20 G Anterior;Left Forearm (Active)   Site Assessment Clean;Dry;Intact 04/03/24 0857   Dressing Type Transparent 04/03/24 0857   Line Status Flushed;Infusing;Scrubbed the hub prior to access 04/03/24 0857   Dressing Status Clean;Dry;Intact 04/03/24 0857   Dressing Intervention N/A 04/03/24 0857   Dressing Change Due 04/06/24 04/02/24 2110   Date Primary Tubing Changed 04/02/24 04/02/24 2110   Date Secondary Tubing Changed 04/02/24 04/02/24 2110   Infiltration Grading ([facility], [facility]) 0 04/03/24 0857   Phlebitis Scale ([facility] Only) 0 04/03/24 0857               MENTAL STATUS ON TRANSFER      A&OX4       CONSULTATIONS  Orthopedics, GI    PROCEDURES  As noted above    [id number] VIEW   Final Result      Nonobstructive bowel gas pattern.      DX-PORTABLE FLUOROSCOPY < 1 HOUR   Final Result      Portable fluoroscopy utilized for 1 minute 20 seconds.         INTERPRETING LOCATION: [address] , [place], [address]      DX-FEMUR-2+ LEFT   Final Result      Digitized intraoperative radiograph is submitted for review. This examination is not for diagnostic purpose but for guidance during a surgical procedure. Please see the patient's chart for full procedural details.         INTERPRETING LOCATION: [address], [place], [address]      DX-KNEE 2- LEFT   Final Result         1.  Comminuted displaced distal femoral metaphyseal fracture    "   CT-HEAD W/O   Final Result         1.  No acute intracranial abnormality is identified, there are nonspecific white matter changes, commonly associated with small vessel ischemic disease.  Associated mild cerebral atrophy is noted.   2.  Atherosclerosis.               LABORATORY  Lab Results   Component Value Date    SODIUM 135 04/02/2024    POTASSIUM 5.1 04/02/2024    CHLORIDE 103 04/02/2024    CO2 26 04/02/2024    GLUCOSE 118 (H) 04/02/2024    BUN 36 (H) 04/02/2024    CREATININE 0.89 04/02/2024        Lab Results   Component Value Date    WBC 8.6 04/04/2024    HEMOGLOBIN 7.4 (L) 04/04/2024    HEMATOCRIT 23.1 (L) 04/04/2024    PLATELETCT 207 04/04/2024        Total time of the discharge process exceeds 54 minutes.

## 2024-04-03 NOTE — THERAPY
Occupational Therapy Contact Note    Patient Name: Jaqui Sifuentes  Age:  88 y.o., Sex:  female  Medical Record #: [id number]  Today's Date: 4/3/2024    Pt off the floor for gastroscopy this AM. Will hold OT session and reattempt as appropriate/able.

## 2024-04-03 NOTE — DISCHARGE PLANNING
@2733  DPA faxed the discharge summary to [facility], Attn:  Frances.  Fax #[phone].    Fax confirmation received.

## 2024-04-03 NOTE — ANESTHESIA PREPROCEDURE EVALUATION
Case: [id number] Date/Time: 04/03/24 0910    Procedure: GASTROSCOPY (Esophagus)    Anesthesia type: MAC    Pre-op diagnosis: Hematemesis, anemia    Location: [facility] ROOM 26 / SURGERY SAME DAY [facility]    Surgeons: Nellie Cotton M.D.          Anemic here for EGD. S/p orif femur a couple weeks ago.    Relevant Problems   PULMONARY   (positive) COPD (chronic obstructive pulmonary disease) (HCC)      CARDIAC   (positive) Aortic stenosis   (positive) Atrial fibrillation (HCC)   (positive) Hypertension         (positive) Acute renal failure (ARF) (HCC)      Other   (positive) Anemia   BMI 41  COPD on home O2    Physical Exam    Airway   Mallampati: II  TM distance: >3 FB  Neck ROM: full       Cardiovascular - normal exam  Rhythm: regular  Rate: normal  (-) murmur     Dental - normal exam           Pulmonary - normal exam  Breath sounds clear to auscultation     Abdominal    Neurological - normal exam                   Anesthesia Plan    ASA 3       Plan - general and MAC               Induction: intravenous    Postoperative Plan: Postoperative administration of opioids is intended.    Pertinent diagnostic labs and testing reviewed    Informed Consent:    Anesthetic plan and risks discussed with patient and healthcare power of .    Use of blood products discussed with: patient whom consented to blood products.

## 2024-04-03 NOTE — ANESTHESIA POSTPROCEDURE EVALUATION
Patient: Jaqui Sifuentes    Procedure Summary       Date: 04/03/24 Room / Location: [facility] ROOM 26 / SURGERY SAME DAY [facility]    Anesthesia Start: 0902 Anesthesia Stop: 0926    Procedures:       GASTROSCOPY (Esophagus)      GASTROSCOPY, WITH BIOPSY (Esophagus) Diagnosis: (esophagitis, gastric erosions and ulcers, large duodenal ulcer)    Surgeons: Nellie Cotton M.D. Responsible Provider: Ranjeet Thompson M.D.    Anesthesia Type: general, MAC ASA Status: 3            Final Anesthesia Type: general, MAC  Last vitals  BP   Blood Pressure : 118/56    Temp   36.1 °C (97 °F)    Pulse   80   Resp   17    SpO2   100 %      Anesthesia Post Evaluation    Patient location during evaluation: PACU  Patient participation: complete - patient participated  Level of consciousness: awake and alert    Airway patency: patent  Anesthetic complications: no  Cardiovascular status: hemodynamically stable  Respiratory status: acceptable  Hydration status: euvolemic    PONV: none          No notable events documented.     Nurse Pain Score: 0 (NPRS)

## 2024-04-03 NOTE — CARE PLAN
The patient is Stable - Low risk of patient condition declining or worsening    Shift Goals  Clinical Goals: Patient will have nausea and GI discomfort be managed within the shift  Patient Goals: N/V control  Family Goals: DERREK    Progress made toward(s) clinical / shift goals:  Complained of intermittent nausea and heartburn, declined pharmacologic regimen. No vomiting or signs of bleeding noted. On NPO with sips for medications as per order. Able to make needs known, call bell placed within easy reach. Safety precautions in placed.     Patient is not progressing towards the following goals:    Problem: Nutrition  Goal: Patient's nutritional and fluid intake will be adequate or improve  Description: Target End Date:  Prior to discharge or change in level of care    Document on I/O flowsheet    1.  Monitor nutritional intake  2.  Monitor weight per provider order  3.  Assess patient's ability to take oral nutrition  4.  Collaborate with Speech Therapy, Dietitian and interdisciplinary team for appropriate feeding and fluid intake  5.  Assist with feeding  Outcome: Not Progressing

## 2024-04-03 NOTE — DISCHARGE PLANNING
Case Management Discharge Planning    Admission Date: 3/27/2024  GMLOS: 4.5  ALOS: 7    6-Clicks ADL Score: 17  6-Clicks Mobility Score: 12  PT and/or OT Eval ordered: Yes  Post-acute Referrals Ordered: Yes  Post-acute Choice Obtained: Yes  Has referral(s) been sent to post-acute provider:  Yes      Anticipated Discharge Dispo: Discharge Disposition: Disch to  rehab facility or distinct part unit (62)    DME Needed: No    Action(s) Taken: LMSW sent updated labs to [facility] at [phone].    Addendum @ 9816: LMSW received quotes on transport to patients facility of choice:     [facility]   [facility]   [place]3500.13    [facility]   [facility]   $   Pending quote as it is an out of state transport.     [facility]  $32,525.00    Addendum @ 7583: LMSW met with pt and daughter Tiff at bedside to discuss DC transport options and cost. Pt and daughter Tiff requesting to speak with Jacob from [facility] over the cost of transport and how they would pay. Tiff gave permission to this LMSW to give her number to Jacob. Once transport is confirmed, then this LMSW will coordinate information with Jacob.     Addendum @ 2769: LMSW updated by Jacob from [facility]. The pt and Tiff paid for and approved the DC transport to [facility] for tomorrow at 0800 on 04/04/24. LMSW updated Frances from [facility] and bedside RN updated.     Addendum @ 1906: LMSW met with pt at bedside to complete IMM and discuss DC. LMSW updated MD and MD wrote DC Summary. DPA to fax DC Summary to [facility].       Escalations Completed: None    Medically Clear: No    Next Steps: LMSW to follow for placement.     Barriers to Discharge: Medical clearance and Pending Placement    Is the patient up for discharge tomorrow: No

## 2024-04-03 NOTE — PROCEDURES
OPERATIVE REPORT    PATIENT:   Jaqui Sifuentes   1935       PREOPERATIVE DIAGNOSES/INDICATIONS: coffee grounds emesis    POSTOPERATIVE DIAGNOSIS: large duodenal bulb ulcer Braxton IIc, multiple antral ulcers Braxton III, gastric erosions, abnormal cardia fold, LA class C erosive esophagitis    PROCEDURE:  ESOPHAGOGASTRODUODENOSCOPY with biopsies    PHYSICIAN:  Nellie Cotton MD    ANESTHESIA:  Per anesthesiologist.    LOCATION: [facility]    CONSENT:  OBTAINED. The risks, benefits and alternatives of the procedure were discussed in details. The risks include and are not limited to bleeding, infection, perforation, missed lesions, and sedations risks (cardiopulmonary compromise and allergic reaction to medications).    DESCRIPTION: The patient presented to the procedure room.  After routine checkup was performed, patient was brought into the endoscopy suite.  Patient was placed on her left lateral decubitus position. A bite block was placed in patient's mouth. Patient was sedated by anesthesia.  Vital signs were monitored throughout the procedure.  Oxygenation support was provided throughout the procedure. Upper endoscope was inserted into patient's mouth and advanced to the second portion of the duodenum under direct visualization.      Once the site was reached and examined, the upper endoscope was withdrawn.  Retroflexion was made within the stomach.  The stomach was decompressed, scope was withdrawn and the procedure was terminated.     The patient tolerated the procedure well.  There were no immediate complications.    OPERATIVE FINDINGS:    1. Esophagus: desquamating mucosa distal half esophagus with erosions, no stigmata of recent bleeding  2. Stomach: multiple clean based antral ulcers with scattered erosions. No blood in lumen.  Gastric bx taken for H pylori.  At the cardia, mucosa at 6:00 appeared abnormal and biopsies taken.  Appears to be carditis.  3. Duodenum: entire bulb was ulcerated with  multiple pigmented spots too extensive to treat.  No blood in lumen.  Did not attempt trying to find lumen to pass to second portion due to extensive ulceration    RECOMMENDATIONS:  She will need BID PPI x 8 weeks then follow up EGD with community GI  Added Carafate slurry QID x 2 weeks  With her ulceration, her risk of rebleeding is 10-20%.  If we can hold her anticoagulation at least 1 more day, that may be best.  Follow up gastric bx for H pylori.  NO NSAIDs or ASA  Full liq diet

## 2024-04-03 NOTE — THERAPY
Physical Therapy   Daily Treatment     Patient Name: Jaqui Sifuentes  Age:  88 y.o., Sex:  female  Medical Record #: [id number]  Today's Date: 4/3/2024     Precautions  Precautions: (P) Fall Risk;Toe Touch Weight Bearing Left Lower Extremity  Comments: (P) immobilizer AAT    Assessment    The pt was found seated on the EOB working on her lunch, willing to participate w/PT. Today the pt required min assist w/sup->sit transition w/HOB flat and use of railing, Min assist w/STS->FWW and Min assist for bed->chair transfer w/FWW. The pt pivoted on her Rt foot, does a good job maintaining her WB precautions. The pt is unable to take a step w/the FWW.   Possible d/c tomorrow.  PT will cont to follow.     Plan    Treatment Plan Status: (P) Continue Current Treatment Plan  Type of Treatment: Bed Mobility, Equipment, Gait Training, Neuro Re-Education / Balance, Self Care / Home Evaluation, Stair Training, Therapeutic Activities, Therapeutic Exercise  Treatment Frequency: 5 Times per Week  Treatment Duration: Until Therapy Goals Met    DC Equipment Recommendations: (P) Unable to determine at this time  Discharge Recommendations: (P) Recommend post-acute placement for additional physical therapy services prior to discharge home    Objective       04/03/24 1459   Charge Group   PT Therapeutic Activities (Units) 2   Total Time Spent   PT Therapeutic Activities Time Spent (Mins) 25   Precautions   Precautions Fall Risk;Toe Touch Weight Bearing Left Lower Extremity   Comments immobilizer AAT   Pain 0 - 10 Group   Pain Rating Scale (NPRS) 0   Therapist Pain Assessment During Activity   Other Treatments   Other Treatments Provided Pt and dtr instructed on proper placement of knee immobilizer, re-adjusted for the pt. Educated them both that lifting the LE wo/assist is not going to hinder the surgery. Answered ?'s from dtr and instructed the pt on use of her IS.   Balance   Sitting Balance (Static) Good   Sitting Balance (Dynamic) Fair  +   Standing Balance (Static) Fair -   Standing Balance (Dynamic) Poor   Weight Shift Sitting Fair   Weight Shift Standing Absent   Comments standing w/FWW   Bed Mobility    Supine to Sit Minimal Assist  (w/HOB flat and use of railing)   Sit to Supine   (pt left seated in the chair to finish her lunch)   Scooting Standby Assist  (seated)   Rolling Contact Guard Assist  (w/railing)   Gait Analysis   Gait Level Of Assist Unable to Participate   Functional Mobility   Sit to Stand Minimal Assist  (from EOB->FWW)   Bed, Chair, Wheelchair Transfer Minimal Assist  (bed->chair, going to her Rt side)   Transfer Method Stand Pivot   Comments The pt managed a transfer to the w/c w/FWW support. The pt does a good job maintaining her WB precautions, unable to take a step. The pt scoots Rt foot on the floor for the transfer.   6 Clicks Assessment - How much HELP from from another person do you currently need... (If the patient hasn't done an activity recently, how much help from another person do you think he/she would need if he/she tried?)   Turning from your back to your side while in a flat bed without using bedrails? 3   Moving from lying on your back to sitting on the side of a flat bed without using bedrails? 3   Moving to and from a bed to a chair (including a wheelchair)? 3   Standing up from a chair using your arms (e.g., wheelchair, or bedside chair)? 3   Walking in hospital room? 2   Climbing 3-5 steps with a railing? 1   6 clicks Mobility Score 15   Short Term Goals    Short Term Goal # 1 Pt will perform supine <> sit with SPV in 6 visits to progress bed mobility   Goal Outcome # 1 goal not met   Short Term Goal # 2 Pt will perform STS with FWW and min A in 6 visits to progress OOB mobility   Goal Outcome # 2 Goal met   Short Term Goal # 3 Pt will perform stand pivot transfers with FWW and min A in 6 visits to progress functional OOB mobility   Goal Outcome # 3 Goal met   Short Term Goal # 4 Pt will ambulate 50 ft  with FWW and mod A in 6 visits to progress functional gait   Goal Outcome # 4 Goal not met   Education Group   Role of Physical Therapist Patient Response Patient;Acceptance;Explanation;Reinforcement Needed   Physical Therapy Treatment Plan   Physical Therapy Treatment Plan Continue Current Treatment Plan   Anticipated Discharge Equipment and Recommendations   DC Equipment Recommendations Unable to determine at this time   Discharge Recommendations Recommend post-acute placement for additional physical therapy services prior to discharge home   Interdisciplinary Plan of Care Collaboration   IDT Collaboration with  Nursing;Family / Caregiver   Patient Position at End of Therapy Seated;Call Light within Reach;Tray Table within Reach;Phone within Reach;Family / Friend in Room   Collaboration Comments Nrsg notified of pts tx efforts   Session Information   Date / Session Number  4/3--2 (2/5, 4/4) PTA/1   Supervising Physical Therapist (PTA Treatments Only)   Supervising Physical Therapist Alanna Hardin

## 2024-04-04 VITALS
BODY MASS INDEX: 40.62 KG/M2 | HEIGHT: 65 IN | TEMPERATURE: 97.6 F | HEART RATE: 80 BPM | OXYGEN SATURATION: 97 % | SYSTOLIC BLOOD PRESSURE: 95 MMHG | RESPIRATION RATE: 17 BRPM | WEIGHT: 243.83 LBS | DIASTOLIC BLOOD PRESSURE: 60 MMHG

## 2024-04-04 LAB
BACTERIA BLD CULT: NORMAL
BACTERIA BLD CULT: NORMAL
BASOPHILS # BLD AUTO: 0.5 % (ref 0–1.8)
BASOPHILS # BLD: 0.04 K/UL (ref 0–0.12)
EOSINOPHIL # BLD AUTO: 0.29 K/UL (ref 0–0.51)
EOSINOPHIL NFR BLD: 3.4 % (ref 0–6.9)
ERYTHROCYTE [DISTWIDTH] IN BLOOD BY AUTOMATED COUNT: 52.8 FL (ref 35.9–50)
HCT VFR BLD AUTO: 23.1 % (ref 37–47)
HGB BLD-MCNC: 7.4 G/DL (ref 12–16)
IMM GRANULOCYTES # BLD AUTO: 0.1 K/UL (ref 0–0.11)
IMM GRANULOCYTES NFR BLD AUTO: 1.2 % (ref 0–0.9)
LYMPHOCYTES # BLD AUTO: 1.45 K/UL (ref 1–4.8)
LYMPHOCYTES NFR BLD: 16.8 % (ref 22–41)
MCH RBC QN AUTO: 30.7 PG (ref 27–33)
MCHC RBC AUTO-ENTMCNC: 32 G/DL (ref 32.2–35.5)
MCV RBC AUTO: 95.9 FL (ref 81.4–97.8)
MONOCYTES # BLD AUTO: 0.73 K/UL (ref 0–0.85)
MONOCYTES NFR BLD AUTO: 8.4 % (ref 0–13.4)
NEUTROPHILS # BLD AUTO: 6.03 K/UL (ref 1.82–7.42)
NEUTROPHILS NFR BLD: 69.7 % (ref 44–72)
NRBC # BLD AUTO: 0 K/UL
NRBC BLD-RTO: 0 /100 WBC (ref 0–0.2)
PLATELET # BLD AUTO: 207 K/UL (ref 164–446)
PMV BLD AUTO: 9.4 FL (ref 9–12.9)
RBC # BLD AUTO: 2.41 M/UL (ref 4.2–5.4)
SIGNIFICANT IND 70042: NORMAL
SIGNIFICANT IND 70042: NORMAL
SITE SITE: NORMAL
SITE SITE: NORMAL
SOURCE SOURCE: NORMAL
SOURCE SOURCE: NORMAL
WBC # BLD AUTO: 8.6 K/UL (ref 4.8–10.8)

## 2024-04-04 PROCEDURE — 85025 COMPLETE CBC W/AUTO DIFF WBC: CPT

## 2024-04-04 PROCEDURE — 700102 HCHG RX REV CODE 250 W/ 637 OVERRIDE(OP): Performed by: HOSPITALIST

## 2024-04-04 PROCEDURE — 700111 HCHG RX REV CODE 636 W/ 250 OVERRIDE (IP): Mod: JZ | Performed by: INTERNAL MEDICINE

## 2024-04-04 PROCEDURE — 700105 HCHG RX REV CODE 258: Performed by: INTERNAL MEDICINE

## 2024-04-04 PROCEDURE — A9270 NON-COVERED ITEM OR SERVICE: HCPCS | Performed by: INTERNAL MEDICINE

## 2024-04-04 PROCEDURE — A9270 NON-COVERED ITEM OR SERVICE: HCPCS | Performed by: HOSPITALIST

## 2024-04-04 PROCEDURE — 36415 COLL VENOUS BLD VENIPUNCTURE: CPT

## 2024-04-04 PROCEDURE — 99239 HOSP IP/OBS DSCHRG MGMT >30: CPT | Performed by: INTERNAL MEDICINE

## 2024-04-04 PROCEDURE — 700102 HCHG RX REV CODE 250 W/ 637 OVERRIDE(OP): Performed by: INTERNAL MEDICINE

## 2024-04-04 PROCEDURE — 700102 HCHG RX REV CODE 250 W/ 637 OVERRIDE(OP): Performed by: ORTHOPAEDIC SURGERY

## 2024-04-04 PROCEDURE — A9270 NON-COVERED ITEM OR SERVICE: HCPCS | Performed by: ORTHOPAEDIC SURGERY

## 2024-04-04 RX ADMIN — LEVOTHYROXINE SODIUM 175 MCG: 0.17 TABLET ORAL at 05:23

## 2024-04-04 RX ADMIN — OMEPRAZOLE 40 MG: 20 CAPSULE, DELAYED RELEASE ORAL at 05:22

## 2024-04-04 RX ADMIN — LORATADINE 10 MG: 10 TABLET ORAL at 05:23

## 2024-04-04 RX ADMIN — DOCUSATE SODIUM 100 MG: 100 CAPSULE, LIQUID FILLED ORAL at 05:22

## 2024-04-04 RX ADMIN — SODIUM CHLORIDE 250 MG: 9 INJECTION, SOLUTION INTRAVENOUS at 05:24

## 2024-04-04 RX ADMIN — LISINOPRIL 5 MG: 5 TABLET ORAL at 05:23

## 2024-04-04 ASSESSMENT — PAIN DESCRIPTION - PAIN TYPE: TYPE: ACUTE PAIN

## 2024-04-04 NOTE — CARE PLAN
The patient is Watcher - Medium risk of patient condition declining or worsening    Shift Goals  Clinical Goals: Pts skin integrity will be maintained throughout shift  Patient Goals: Rest; DC  Family Goals: DC    Progress made toward(s) clinical / shift goals:  Pts skin integrity was maintained by repositioning, mobilization, and incontinence care.     Patient is not progressing towards the following goals: NA

## 2024-04-04 NOTE — CARE PLAN
The patient is Stable - Low risk of patient condition declining or worsening    Shift Goals  Clinical Goals: Maintain skin integrity  Patient Goals: sleep  Family Goals: DC    Progress made toward(s) clinical / shift goals:  Patient is alert and oriented. On oxygen 1.5 saturating above 93%. All needs met at this time. Pain well controled. Call light within reach.    Patient is not progressing towards the following goals:

## 2024-04-04 NOTE — PROGRESS NOTES
GI APRN Interim Note    Patient discharged early this morning prior to APRN encounter.   Hgb stable 7.7-7.1-7.4  Patient discharged with PPI BID and carafate per recommendations.   GI to follow pathology and notify of abnormal results.
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
## 2024-04-04 NOTE — CARE PLAN
The patient is Watcher - Medium risk of patient condition declining or worsening    Shift Goals  Clinical Goals: Pts skin integrity will be maintained throughout shift  Patient Goals: Rest; DC  Family Goals: DC    Progress made toward(s) clinical / shift goals:  Pts skin integrity was maintained by repositioning, mobilization,     Patient is not progressing towards the following goals:

## (undated) DEVICE — NEPTUNE 4 PORT MANIFOLD - (20/PK)

## (undated) DEVICE — DRILL BIT NON-LOCKING SHORT 3.2X216MM

## (undated) DEVICE — SUTURE GENERAL

## (undated) DEVICE — PACK MAJOR ORTHO - (2EA/CA)

## (undated) DEVICE — LACTATED RINGERS INJ 1000 ML - (14EA/CA 60CA/PF)

## (undated) DEVICE — DRAPE U ORTHOPEDIC - (10/BX)

## (undated) DEVICE — CONTAINER, SPECIMEN, STERILE

## (undated) DEVICE — FILM CASSETTE ENDO

## (undated) DEVICE — SODIUM CHL IRRIGATION 0.9% 1000ML (12EA/CA)

## (undated) DEVICE — GLOVE BIOGEL PI INDICATOR SZ 7.5 SURGICAL PF LF -(50/BX 4BX/CA)

## (undated) DEVICE — DRAPE 36X28IN RAD CARM BND BG - (25/CA) O

## (undated) DEVICE — SLEEVE, VASO, THIGH, MED

## (undated) DEVICE — PORT AUXILLARY WATER (50EA/BX)

## (undated) DEVICE — COVER LIGHT HANDLE ALC PLUS DISP (18EA/BX)

## (undated) DEVICE — BANDAGE ELASTIC STERILE MATRIX 6 X 10 (20EA/CA)

## (undated) DEVICE — SUTURE 2-0 VICRYL PLUS CT-1 - 8 X 18 INCH(12/BX)

## (undated) DEVICE — SET LEADWIRE 5 LEAD BEDSIDE DISPOSABLE ECG (1SET OF 5/EA)

## (undated) DEVICE — CHLORAPREP 26 ML APPLICATOR - ORANGE TINT(25/CA)

## (undated) DEVICE — DRESSING PETROLEUM GAUZE 5 X 9" (50EA/BX 4BX/CA)"

## (undated) DEVICE — WATER IRRIGATION STERILE 1000ML (12EA/CA)

## (undated) DEVICE — DRILL BIT LOCKING SHORT 4.3X216MM

## (undated) DEVICE — DRAPE SURG STERI-DRAPE 7X11OD - (40EA/CA)

## (undated) DEVICE — DRAPE LARGE 3 QUARTER - (20/CA)

## (undated) DEVICE — SET EXTENSION WITH 2 PORTS (48EA/CA) ***PART #2C8610 IS A SUBSTITUTE*****

## (undated) DEVICE — IMMOBILIZER KNEE 20 INCH - (1/EA)

## (undated) DEVICE — SUCTION INSTRUMENT YANKAUER OPEN TIP W/O VENT (50EA/CA)

## (undated) DEVICE — DRESSING XEROFORM 1X8 - (50/BX 4BX/CA)

## (undated) DEVICE — GOWN WARMING STANDARD FLEX - (30/CA)

## (undated) DEVICE — TUBE CONNECTING SUCTION - CLEAR PLASTIC STERILE 72 IN (50EA/CA)

## (undated) DEVICE — ELECTRODE DUAL RETURN W/ CORD - (50/PK)

## (undated) DEVICE — PADDING CAST 6 IN STERILE - 6 X 4 YDS (24/CA)

## (undated) DEVICE — CANISTER SUCTION 3000ML MECHANICAL FILTER AUTO SHUTOFF MEDI-VAC NONSTERILE LF DISP  (40EA/CA)

## (undated) DEVICE — MASK PANORAMIC OXYGEN PRO2 (30EA/CA)

## (undated) DEVICE — STOCKINET TUBULAR 6IN STERILE - 6 X 48YDS (25/CA)

## (undated) DEVICE — GLOVE BIOGEL INDICATOR SZ 8 SURGICAL PF LTX - (50/BX 4BX/CA)

## (undated) DEVICE — BITE BLOCK, DISP.

## (undated) DEVICE — GLOVE BIOGEL SZ 7.5 SURGICAL PF LTX - (50PR/BX 4BX/CA)

## (undated) DEVICE — WRAP COBAN SELF-ADHERENT 6 IN X  5YDS STERILE TAN (12/CA)

## (undated) DEVICE — SENSOR OXIMETER ADULT SPO2 RD SET (20EA/BX)

## (undated) DEVICE — CANISTER SUCTION RIGID RED 1500CC (40EA/CA)

## (undated) DEVICE — STAPLER SKIN DISP - (6/BX 10BX/CA) VISISTAT

## (undated) DEVICE — FORCEP RADIAL JAW 4 STANDARD CAPACITY W/NEEDLE 240CM (40EA/BX)

## (undated) DEVICE — TUBING CLEARLINK DUO-VENT - C-FLO (48EA/CA)

## (undated) DEVICE — SUTURE 0 VICRYL PLUS CT-1 - 8 X 18 INCH (12/BX)

## (undated) DEVICE — BUTTON ENDOSCOPY DISPOSABLE

## (undated) DEVICE — KIT CUSTOM PROCEDURE SINGLE FOR ENDO  (15/CA)

## (undated) DEVICE — SUCTION INSTRUMENT YANKAUER BULBOUS TIP W/O VENT (50EA/CA)

## (undated) DEVICE — GLOVE SZ 7.5 BIOGEL PI MICRO - PF LF (50PR/BX)